# Patient Record
Sex: FEMALE | Race: OTHER | HISPANIC OR LATINO | URBAN - METROPOLITAN AREA
[De-identification: names, ages, dates, MRNs, and addresses within clinical notes are randomized per-mention and may not be internally consistent; named-entity substitution may affect disease eponyms.]

---

## 2018-08-31 ENCOUNTER — EMERGENCY (EMERGENCY)
Facility: HOSPITAL | Age: 33
LOS: 1 days | Discharge: HOME | End: 2018-08-31
Attending: EMERGENCY MEDICINE
Payer: MEDICAID

## 2018-08-31 VITALS
TEMPERATURE: 98 F | DIASTOLIC BLOOD PRESSURE: 87 MMHG | OXYGEN SATURATION: 100 % | SYSTOLIC BLOOD PRESSURE: 132 MMHG | HEART RATE: 94 BPM | RESPIRATION RATE: 16 BRPM

## 2018-08-31 VITALS — WEIGHT: 166.01 LBS | HEIGHT: 63 IN

## 2018-08-31 LAB
ANION GAP SERPL CALC-SCNC: 13 MMOL/L — SIGNIFICANT CHANGE UP (ref 5–17)
APPEARANCE UR: CLEAR — SIGNIFICANT CHANGE UP
BASOPHILS # BLD AUTO: 0 K/UL — SIGNIFICANT CHANGE UP (ref 0–0.2)
BASOPHILS NFR BLD AUTO: 0.1 % — SIGNIFICANT CHANGE UP (ref 0–2)
BILIRUB UR-MCNC: NEGATIVE — SIGNIFICANT CHANGE UP
BLD GP AB SCN SERPL QL: SIGNIFICANT CHANGE UP
BUN SERPL-MCNC: 9 MG/DL — SIGNIFICANT CHANGE UP (ref 8–20)
CALCIUM SERPL-MCNC: 10 MG/DL — SIGNIFICANT CHANGE UP (ref 8.6–10.2)
CHLORIDE SERPL-SCNC: 101 MMOL/L — SIGNIFICANT CHANGE UP (ref 98–107)
CO2 SERPL-SCNC: 23 MMOL/L — SIGNIFICANT CHANGE UP (ref 22–29)
COLOR SPEC: YELLOW — SIGNIFICANT CHANGE UP
CREAT SERPL-MCNC: 0.48 MG/DL — LOW (ref 0.5–1.3)
DIFF PNL FLD: NEGATIVE — SIGNIFICANT CHANGE UP
EOSINOPHIL # BLD AUTO: 0.2 K/UL — SIGNIFICANT CHANGE UP (ref 0–0.5)
EOSINOPHIL NFR BLD AUTO: 2.1 % — SIGNIFICANT CHANGE UP (ref 0–6)
EPI CELLS # UR: SIGNIFICANT CHANGE UP
GLUCOSE SERPL-MCNC: 92 MG/DL — SIGNIFICANT CHANGE UP (ref 70–115)
GLUCOSE UR QL: NEGATIVE MG/DL — SIGNIFICANT CHANGE UP
HCG SERPL-ACNC: SIGNIFICANT CHANGE UP MIU/ML
HCG UR QL: POSITIVE
HCT VFR BLD CALC: 41.2 % — SIGNIFICANT CHANGE UP (ref 37–47)
HGB BLD-MCNC: 13.5 G/DL — SIGNIFICANT CHANGE UP (ref 12–16)
KETONES UR-MCNC: NEGATIVE — SIGNIFICANT CHANGE UP
LEUKOCYTE ESTERASE UR-ACNC: ABNORMAL
LYMPHOCYTES # BLD AUTO: 2 K/UL — SIGNIFICANT CHANGE UP (ref 1–4.8)
LYMPHOCYTES # BLD AUTO: 23.7 % — SIGNIFICANT CHANGE UP (ref 20–55)
MCHC RBC-ENTMCNC: 26.9 PG — LOW (ref 27–31)
MCHC RBC-ENTMCNC: 32.8 G/DL — SIGNIFICANT CHANGE UP (ref 32–36)
MCV RBC AUTO: 82.2 FL — SIGNIFICANT CHANGE UP (ref 81–99)
MONOCYTES # BLD AUTO: 0.8 K/UL — SIGNIFICANT CHANGE UP (ref 0–0.8)
MONOCYTES NFR BLD AUTO: 9.4 % — SIGNIFICANT CHANGE UP (ref 3–10)
NEUTROPHILS # BLD AUTO: 5.4 K/UL — SIGNIFICANT CHANGE UP (ref 1.8–8)
NEUTROPHILS NFR BLD AUTO: 64.3 % — SIGNIFICANT CHANGE UP (ref 37–73)
NITRITE UR-MCNC: NEGATIVE — SIGNIFICANT CHANGE UP
PH UR: 6.5 — SIGNIFICANT CHANGE UP (ref 5–8)
PLATELET # BLD AUTO: 319 K/UL — SIGNIFICANT CHANGE UP (ref 150–400)
POTASSIUM SERPL-MCNC: 3.6 MMOL/L — SIGNIFICANT CHANGE UP (ref 3.5–5.3)
POTASSIUM SERPL-SCNC: 3.6 MMOL/L — SIGNIFICANT CHANGE UP (ref 3.5–5.3)
PROT UR-MCNC: NEGATIVE MG/DL — SIGNIFICANT CHANGE UP
RBC # BLD: 5.01 M/UL — SIGNIFICANT CHANGE UP (ref 4.4–5.2)
RBC # FLD: 13.8 % — SIGNIFICANT CHANGE UP (ref 11–15.6)
SODIUM SERPL-SCNC: 137 MMOL/L — SIGNIFICANT CHANGE UP (ref 135–145)
SP GR SPEC: 1.01 — SIGNIFICANT CHANGE UP (ref 1.01–1.02)
TYPE + AB SCN PNL BLD: SIGNIFICANT CHANGE UP
UROBILINOGEN FLD QL: NEGATIVE MG/DL — SIGNIFICANT CHANGE UP
WBC # BLD: 8.5 K/UL — SIGNIFICANT CHANGE UP (ref 4.8–10.8)
WBC # FLD AUTO: 8.5 K/UL — SIGNIFICANT CHANGE UP (ref 4.8–10.8)
WBC UR QL: SIGNIFICANT CHANGE UP

## 2018-08-31 PROCEDURE — 81025 URINE PREGNANCY TEST: CPT

## 2018-08-31 PROCEDURE — 86901 BLOOD TYPING SEROLOGIC RH(D): CPT

## 2018-08-31 PROCEDURE — 99284 EMERGENCY DEPT VISIT MOD MDM: CPT

## 2018-08-31 PROCEDURE — 76801 OB US < 14 WKS SINGLE FETUS: CPT | Mod: 26

## 2018-08-31 PROCEDURE — 36415 COLL VENOUS BLD VENIPUNCTURE: CPT

## 2018-08-31 PROCEDURE — 81001 URINALYSIS AUTO W/SCOPE: CPT

## 2018-08-31 PROCEDURE — 86900 BLOOD TYPING SEROLOGIC ABO: CPT

## 2018-08-31 PROCEDURE — 76817 TRANSVAGINAL US OBSTETRIC: CPT | Mod: 26

## 2018-08-31 PROCEDURE — T1013: CPT

## 2018-08-31 PROCEDURE — 76817 TRANSVAGINAL US OBSTETRIC: CPT

## 2018-08-31 PROCEDURE — 85027 COMPLETE CBC AUTOMATED: CPT

## 2018-08-31 PROCEDURE — 86850 RBC ANTIBODY SCREEN: CPT

## 2018-08-31 PROCEDURE — 76801 OB US < 14 WKS SINGLE FETUS: CPT

## 2018-08-31 PROCEDURE — 80048 BASIC METABOLIC PNL TOTAL CA: CPT

## 2018-08-31 PROCEDURE — 84702 CHORIONIC GONADOTROPIN TEST: CPT

## 2018-08-31 NOTE — ED ADULT NURSE NOTE - OBJECTIVE STATEMENT
Patient states that she has been having pelvic pain for the last week. Patient states that the pain is intermittent and is worse with walking. Patient states that she feels like "something is coming out". Patient denies any vaginal discharge. Pt states that she does have a hx of fibroids.

## 2018-08-31 NOTE — ED STATDOCS - PROGRESS NOTE DETAILS
NP NOTE:  34 y/o F , LMP 18 presents with c/o cramping, denies bleeding or spotting.  HPI, ROS, and PE of intake doctor reviewed.   ROS: no fever or chills, no visual disturbances, no ear pain or decreased hearing, sore throat or dysphagia, no CP, edema, no SOB, wheezing or cough, no abdominal pain, nausea, vomiting, diarrhea or constipation, no dysuria or hematuria, + pelvic cramping, no back or neck pain, no HA, dizziness, or weakness, no rash, pruritis.  PE: Well developed well appearing, in NAD, PERRL, lungs CTA/BL, S1S2 RR no murmur, no edema, abd + bs x 4 soft, LLQ tenderness to palpation, CHRISTO, no CVA tenderness, A&O x 3, CN II-Xii GI, MAEx 4,  5/5/ motors, = sensation, skin warm, dry and intact, no rash. NP NOTE:  Labs and US reviewed, Single live IUP.  Will d/c home f/u Dr. Villa early next week.  Results printed and given to patient.

## 2018-08-31 NOTE — ED STATDOCS - OBJECTIVE STATEMENT
34 y/o F pt presents to ED c/o throbbing abdominal pain and rectal pain. Pt is pregnant. +nausea. LMP; July 15th. When she was pregnant with last child, had placenta previa and fibroids. Has not seen doctor since pregnancy discovery. Denies vaginal bleeding, dysuria, hematuria.   : Jazmin BYNUM P 1, 0 A1 32 y/o F pt presents to ED c/o throbbing pain in LLQ and anus. Pt is pregnant: LMP 7/15/18.  +nausea. Concerned because of h/o placenta previa with last child and h/o fibroids. Has not seen doctor since pregnancy discovery. Denies vaginal bleeding, dysuria, hematuria.   : Jazmin GILLESPIE P [id number]

## 2018-08-31 NOTE — ED ADULT NURSE NOTE - CHPI ED NUR SYMPTOMS NEG
no abdominal distension/no nausea/no blood in stool/no chills/no dysuria/no fever/no hematuria/no diarrhea

## 2018-08-31 NOTE — ED STATDOCS - EYES, MLM
clear bilaterally.  Pupils equal, round, and reactive to light. No scleral icterus No scleral icterus

## 2018-08-31 NOTE — ED ADULT NURSE NOTE - NSIMPLEMENTINTERV_GEN_ALL_ED
Implemented All Universal Safety Interventions:  [place] to call system. Call bell, personal items and telephone within reach. Instruct patient to call for assistance. Room bathroom lighting operational. Non-slip footwear when patient is off stretcher. Physically safe environment: no spills, clutter or unnecessary equipment. Stretcher in lowest position, wheels locked, appropriate side rails in place.

## 2018-10-04 ENCOUNTER — APPOINTMENT (OUTPATIENT)
Dept: OBGYN | Facility: CLINIC | Age: 33
End: 2018-10-04
Payer: MEDICAID

## 2018-10-04 VITALS
DIASTOLIC BLOOD PRESSURE: 87 MMHG | WEIGHT: 164 LBS | SYSTOLIC BLOOD PRESSURE: 128 MMHG | HEIGHT: 63 IN | HEART RATE: 86 BPM | BODY MASS INDEX: 29.06 KG/M2

## 2018-10-04 DIAGNOSIS — Z78.9 OTHER SPECIFIED HEALTH STATUS: ICD-10-CM

## 2018-10-04 PROCEDURE — 99214 OFFICE O/P EST MOD 30 MIN: CPT

## 2018-10-05 LAB
C TRACH RRNA SPEC QL NAA+PROBE: NOT DETECTED
HPV HIGH+LOW RISK DNA PNL CVX: NOT DETECTED
N GONORRHOEA RRNA SPEC QL NAA+PROBE: NOT DETECTED
SOURCE TP AMPLIFICATION: NORMAL

## 2018-10-08 LAB — CYTOLOGY CVX/VAG DOC THIN PREP: NORMAL

## 2018-10-12 ENCOUNTER — APPOINTMENT (OUTPATIENT)
Dept: ANTEPARTUM | Facility: CLINIC | Age: 33
End: 2018-10-12

## 2018-10-17 ENCOUNTER — LABORATORY RESULT (OUTPATIENT)
Age: 33
End: 2018-10-17

## 2018-10-18 ENCOUNTER — ASOB RESULT (OUTPATIENT)
Age: 33
End: 2018-10-18

## 2018-10-18 ENCOUNTER — APPOINTMENT (OUTPATIENT)
Dept: ANTEPARTUM | Facility: CLINIC | Age: 33
End: 2018-10-18

## 2018-10-18 ENCOUNTER — APPOINTMENT (OUTPATIENT)
Dept: MATERNAL FETAL MEDICINE | Facility: CLINIC | Age: 33
End: 2018-10-18
Payer: MEDICAID

## 2018-10-18 ENCOUNTER — APPOINTMENT (OUTPATIENT)
Dept: ANTEPARTUM | Facility: CLINIC | Age: 33
End: 2018-10-18
Payer: MEDICAID

## 2018-10-18 PROCEDURE — 99213 OFFICE O/P EST LOW 20 MIN: CPT | Mod: 25

## 2018-10-18 PROCEDURE — 76801 OB US < 14 WKS SINGLE FETUS: CPT

## 2018-10-19 ENCOUNTER — NON-APPOINTMENT (OUTPATIENT)
Age: 33
End: 2018-10-19

## 2018-10-19 ENCOUNTER — APPOINTMENT (OUTPATIENT)
Dept: OBGYN | Facility: CLINIC | Age: 33
End: 2018-10-19
Payer: MEDICAID

## 2018-10-19 VITALS
WEIGHT: 166 LBS | DIASTOLIC BLOOD PRESSURE: 79 MMHG | HEIGHT: 63 IN | HEART RATE: 100 BPM | SYSTOLIC BLOOD PRESSURE: 135 MMHG | BODY MASS INDEX: 29.41 KG/M2

## 2018-10-19 LAB
BILIRUB UR QL STRIP: NORMAL
CLARITY UR: CLEAR
COLLECTION METHOD: NORMAL
GLUCOSE UR-MCNC: NORMAL
HCG UR QL: 0.2 EU/DL
HGB UR QL STRIP.AUTO: NORMAL
KETONES UR-MCNC: NORMAL
LEUKOCYTE ESTERASE UR QL STRIP: NORMAL
NITRITE UR QL STRIP: NORMAL
PH UR STRIP: 7
PROT UR STRIP-MCNC: NORMAL
SP GR UR STRIP: 1.01

## 2018-10-19 PROCEDURE — 90686 IIV4 VACC NO PRSV 0.5 ML IM: CPT

## 2018-10-19 PROCEDURE — 0502F SUBSEQUENT PRENATAL CARE: CPT

## 2018-10-19 PROCEDURE — G0008: CPT

## 2018-10-22 LAB
ABO + RH PNL BLD: NORMAL
APPEARANCE: CLEAR
BACTERIA UR CULT: NORMAL
BACTERIA: NEGATIVE
BASOPHILS # BLD AUTO: 0.02 K/UL
BASOPHILS NFR BLD AUTO: 0.2 %
BILIRUBIN URINE: NEGATIVE
BLD GP AB SCN SERPL QL: NORMAL
BLOOD URINE: NEGATIVE
CMV IGG SERPL QL: 4.6 U/ML
CMV IGG SERPL-IMP: POSITIVE
COLOR: YELLOW
EOSINOPHIL # BLD AUTO: 0.27 K/UL
EOSINOPHIL NFR BLD AUTO: 3.1 %
GLUCOSE QUALITATIVE U: NEGATIVE MG/DL
HBV SURFACE AG SER QL: NONREACTIVE
HCT VFR BLD CALC: 36.9 %
HCV AB SER QL: NONREACTIVE
HCV S/CO RATIO: 0.09 S/CO
HGB BLD-MCNC: 12.5 G/DL
HIV1+2 AB SPEC QL IA.RAPID: NONREACTIVE
HYALINE CASTS: 2 /LPF
IMM GRANULOCYTES NFR BLD AUTO: 0.5 %
KETONES URINE: NEGATIVE
LEUKOCYTE ESTERASE URINE: NEGATIVE
LYMPHOCYTES # BLD AUTO: 2.3 K/UL
LYMPHOCYTES NFR BLD AUTO: 26.1 %
M TB IFN-G BLD-IMP: NEGATIVE
MAN DIFF?: NORMAL
MCHC RBC-ENTMCNC: 27.7 PG
MCHC RBC-ENTMCNC: 33.9 GM/DL
MCV RBC AUTO: 81.6 FL
MEV IGG FLD QL IA: >300 AU/ML
MEV IGG+IGM SER-IMP: POSITIVE
MEV IGM SER QL: NEGATIVE
MICROSCOPIC-UA: NORMAL
MONOCYTES # BLD AUTO: 0.59 K/UL
MONOCYTES NFR BLD AUTO: 6.7 %
NEUTROPHILS # BLD AUTO: 5.59 K/UL
NEUTROPHILS NFR BLD AUTO: 63.4 %
NITRITE URINE: NEGATIVE
PH URINE: 7
PLATELET # BLD AUTO: 290 K/UL
PROTEIN URINE: NEGATIVE MG/DL
QUANTIFERON TB PLUS MITOGEN MINUS NIL: >10 IU/ML
QUANTIFERON TB PLUS NIL: 0.03 IU/ML
QUANTIFERON TB PLUS TB1 MINUS NIL: 0 IU/ML
QUANTIFERON TB PLUS TB2 MINUS NIL: 0 IU/ML
RBC # BLD: 4.52 M/UL
RBC # FLD: 13.4 %
RED BLOOD CELLS URINE: 3 /HPF
RUBV IGG FLD-ACNC: 5.4 INDEX
RUBV IGG SER-IMP: POSITIVE
SPECIFIC GRAVITY URINE: 1.01
SQUAMOUS EPITHELIAL CELLS: 3 /HPF
T GONDII AB SER-IMP: POSITIVE
T GONDII IGG SER QL: 165 IU/ML
T PALLIDUM AB SER QL IA: NEGATIVE
UROBILINOGEN URINE: NEGATIVE MG/DL
VZV AB TITR SER: POSITIVE
VZV IGG SER IF-ACNC: 296.4 INDEX
WBC # FLD AUTO: 8.81 K/UL
WHITE BLOOD CELLS URINE: 0 /HPF

## 2018-10-24 LAB
B19V IGG SER QL IA: 0.8 INDEX
B19V IGG+IGM SER-IMP: NEGATIVE
B19V IGG+IGM SER-IMP: NORMAL
B19V IGM FLD-ACNC: 0.2 INDEX
B19V IGM SER-ACNC: NEGATIVE
HGB A MFR BLD: 98.2 %
HGB A2 MFR BLD: 1.8 %
HGB FRACT BLD-IMP: NORMAL

## 2018-10-25 LAB — FMR1 GENE MUT ANL BLD/T: NORMAL

## 2018-10-29 LAB
AR GENE MUT ANL BLD/T: NEGATIVE
CFTR MUT TESTED BLD/T: NEGATIVE

## 2018-11-01 ENCOUNTER — APPOINTMENT (OUTPATIENT)
Dept: ANTEPARTUM | Facility: CLINIC | Age: 33
End: 2018-11-01

## 2018-11-05 LAB
2ND TRIMESTER DATA: NORMAL
AFP PNL SERPL: NORMAL
AFP SERPL-ACNC: NORMAL
B-HCG FREE SERPL-MCNC: NORMAL
CLINICAL BIOCHEMIST REVIEW: NORMAL
INHIBIN A SERPL-MCNC: NORMAL
NOTES NTD: NORMAL
U ESTRIOL SERPL-SCNC: NORMAL

## 2018-11-09 ENCOUNTER — NON-APPOINTMENT (OUTPATIENT)
Age: 33
End: 2018-11-09

## 2018-11-09 ENCOUNTER — APPOINTMENT (OUTPATIENT)
Dept: OBGYN | Facility: CLINIC | Age: 33
End: 2018-11-09
Payer: MEDICAID

## 2018-11-09 VITALS
HEIGHT: 63 IN | HEART RATE: 83 BPM | DIASTOLIC BLOOD PRESSURE: 83 MMHG | WEIGHT: 166.31 LBS | SYSTOLIC BLOOD PRESSURE: 122 MMHG | BODY MASS INDEX: 29.47 KG/M2

## 2018-11-09 PROCEDURE — 0502F SUBSEQUENT PRENATAL CARE: CPT

## 2018-11-21 ENCOUNTER — CLINICAL ADVICE (OUTPATIENT)
Age: 33
End: 2018-11-21

## 2018-11-27 DIAGNOSIS — Z34.90 ENCOUNTER FOR SUPERVISION OF NORMAL PREGNANCY, UNSPECIFIED, UNSPECIFIED TRIMESTER: ICD-10-CM

## 2018-11-27 DIAGNOSIS — Z3A.14 14 WEEKS GESTATION OF PREGNANCY: ICD-10-CM

## 2018-11-27 DIAGNOSIS — Z3A.16 16 WEEKS GESTATION OF PREGNANCY: ICD-10-CM

## 2018-11-29 ENCOUNTER — ASOB RESULT (OUTPATIENT)
Age: 33
End: 2018-11-29

## 2018-11-29 ENCOUNTER — APPOINTMENT (OUTPATIENT)
Dept: ANTEPARTUM | Facility: CLINIC | Age: 33
End: 2018-11-29
Payer: MEDICAID

## 2018-11-29 PROCEDURE — 76811 OB US DETAILED SNGL FETUS: CPT

## 2018-11-30 ENCOUNTER — APPOINTMENT (OUTPATIENT)
Dept: OBGYN | Facility: CLINIC | Age: 33
End: 2018-11-30
Payer: MEDICAID

## 2018-11-30 ENCOUNTER — NON-APPOINTMENT (OUTPATIENT)
Age: 33
End: 2018-11-30

## 2018-11-30 VITALS
DIASTOLIC BLOOD PRESSURE: 86 MMHG | HEIGHT: 63 IN | WEIGHT: 169 LBS | BODY MASS INDEX: 29.95 KG/M2 | HEART RATE: 98 BPM | SYSTOLIC BLOOD PRESSURE: 137 MMHG

## 2018-11-30 LAB
BILIRUB UR QL STRIP: NEGATIVE
CLARITY UR: NORMAL
COLLECTION METHOD: NORMAL
GLUCOSE UR-MCNC: NORMAL
HCG UR QL: 0.2 EU/DL
HGB UR QL STRIP.AUTO: NEGATIVE
KETONES UR-MCNC: 1.02
LEUKOCYTE ESTERASE UR QL STRIP: NEGATIVE
NITRITE UR QL STRIP: NEGATIVE
PH UR STRIP: 7
PROT UR STRIP-MCNC: NEGATIVE
SP GR UR STRIP: 1.02

## 2018-11-30 PROCEDURE — 81003 URINALYSIS AUTO W/O SCOPE: CPT | Mod: QW

## 2018-11-30 PROCEDURE — 0502F SUBSEQUENT PRENATAL CARE: CPT

## 2018-12-13 ENCOUNTER — APPOINTMENT (OUTPATIENT)
Dept: ANTEPARTUM | Facility: CLINIC | Age: 33
End: 2018-12-13
Payer: MEDICAID

## 2018-12-13 ENCOUNTER — ASOB RESULT (OUTPATIENT)
Age: 33
End: 2018-12-13

## 2018-12-13 PROCEDURE — 76816 OB US FOLLOW-UP PER FETUS: CPT

## 2018-12-20 DIAGNOSIS — Z3A.19 19 WEEKS GESTATION OF PREGNANCY: ICD-10-CM

## 2018-12-21 ENCOUNTER — NON-APPOINTMENT (OUTPATIENT)
Age: 33
End: 2018-12-21

## 2018-12-21 ENCOUNTER — APPOINTMENT (OUTPATIENT)
Dept: OBGYN | Facility: CLINIC | Age: 33
End: 2018-12-21
Payer: MEDICAID

## 2018-12-21 VITALS
SYSTOLIC BLOOD PRESSURE: 120 MMHG | DIASTOLIC BLOOD PRESSURE: 80 MMHG | WEIGHT: 168 LBS | HEIGHT: 63 IN | BODY MASS INDEX: 29.77 KG/M2

## 2018-12-21 PROCEDURE — 0502F SUBSEQUENT PRENATAL CARE: CPT

## 2018-12-28 DIAGNOSIS — Z3A.23 23 WEEKS GESTATION OF PREGNANCY: ICD-10-CM

## 2019-01-03 ENCOUNTER — APPOINTMENT (OUTPATIENT)
Dept: PEDIATRIC CARDIOLOGY | Facility: CLINIC | Age: 34
End: 2019-01-03
Payer: MEDICAID

## 2019-01-03 PROCEDURE — 76825 ECHO EXAM OF FETAL HEART: CPT

## 2019-01-03 PROCEDURE — 93325 DOPPLER ECHO COLOR FLOW MAPG: CPT

## 2019-01-03 PROCEDURE — 99203 OFFICE O/P NEW LOW 30 MIN: CPT | Mod: 25

## 2019-01-03 PROCEDURE — 76827 ECHO EXAM OF FETAL HEART: CPT

## 2019-01-11 ENCOUNTER — APPOINTMENT (OUTPATIENT)
Dept: OBGYN | Facility: CLINIC | Age: 34
End: 2019-01-11
Payer: MEDICAID

## 2019-01-11 ENCOUNTER — NON-APPOINTMENT (OUTPATIENT)
Age: 34
End: 2019-01-11

## 2019-01-11 VITALS
BODY MASS INDEX: 30.51 KG/M2 | HEIGHT: 63 IN | SYSTOLIC BLOOD PRESSURE: 131 MMHG | WEIGHT: 172.19 LBS | DIASTOLIC BLOOD PRESSURE: 82 MMHG | HEART RATE: 102 BPM

## 2019-01-11 PROCEDURE — 0502F SUBSEQUENT PRENATAL CARE: CPT

## 2019-01-13 LAB
BASOPHILS # BLD AUTO: 0.01 K/UL
BASOPHILS NFR BLD AUTO: 0.1 %
EOSINOPHIL # BLD AUTO: 0.19 K/UL
EOSINOPHIL NFR BLD AUTO: 2 %
GLUCOSE 1H P 50 G GLC PO SERPL-MCNC: 126 MG/DL
HCT VFR BLD CALC: 38.8 %
HGB BLD-MCNC: 11.3 G/DL
IMM GRANULOCYTES NFR BLD AUTO: 1 %
LYMPHOCYTES # BLD AUTO: 2.13 K/UL
LYMPHOCYTES NFR BLD AUTO: 22.8 %
MAN DIFF?: NORMAL
MCHC RBC-ENTMCNC: 28.1 PG
MCHC RBC-ENTMCNC: 29.1 GM/DL
MCV RBC AUTO: 96.5 FL
MONOCYTES # BLD AUTO: 0.49 K/UL
MONOCYTES NFR BLD AUTO: 5.2 %
NEUTROPHILS # BLD AUTO: 6.44 K/UL
NEUTROPHILS NFR BLD AUTO: 68.9 %
PLATELET # BLD AUTO: 354 K/UL
RBC # BLD: 4.02 M/UL
RBC # FLD: 14.2 %
WBC # FLD AUTO: 9.35 K/UL

## 2019-01-30 PROBLEM — O09.522 MULTIGRAVIDA OF ADVANCED MATERNAL AGE IN SECOND TRIMESTER: Status: RESOLVED | Noted: 2019-01-04 | Resolved: 2019-01-30

## 2019-01-30 PROBLEM — Z3A.25 25 WEEKS GESTATION OF PREGNANCY: Status: RESOLVED | Noted: 2019-01-11 | Resolved: 2019-01-30

## 2019-01-31 ENCOUNTER — APPOINTMENT (OUTPATIENT)
Dept: ANTEPARTUM | Facility: CLINIC | Age: 34
End: 2019-01-31
Payer: MEDICAID

## 2019-01-31 ENCOUNTER — ASOB RESULT (OUTPATIENT)
Age: 34
End: 2019-01-31

## 2019-01-31 PROCEDURE — 76816 OB US FOLLOW-UP PER FETUS: CPT

## 2019-01-31 PROCEDURE — 76817 TRANSVAGINAL US OBSTETRIC: CPT

## 2019-02-01 ENCOUNTER — NON-APPOINTMENT (OUTPATIENT)
Age: 34
End: 2019-02-01

## 2019-02-01 ENCOUNTER — APPOINTMENT (OUTPATIENT)
Dept: OBGYN | Facility: CLINIC | Age: 34
End: 2019-02-01
Payer: MEDICAID

## 2019-02-01 VITALS
BODY MASS INDEX: 30.83 KG/M2 | DIASTOLIC BLOOD PRESSURE: 71 MMHG | HEIGHT: 63 IN | WEIGHT: 174 LBS | SYSTOLIC BLOOD PRESSURE: 122 MMHG | HEART RATE: 94 BPM

## 2019-02-01 DIAGNOSIS — Z3A.25 25 WEEKS GESTATION OF PREGNANCY: ICD-10-CM

## 2019-02-01 DIAGNOSIS — O09.522 SUPERVISION OF ELDERLY MULTIGRAVIDA, SECOND TRIMESTER: ICD-10-CM

## 2019-02-01 DIAGNOSIS — N91.1 SECONDARY AMENORRHEA: ICD-10-CM

## 2019-02-01 PROCEDURE — 0502F SUBSEQUENT PRENATAL CARE: CPT

## 2019-02-14 ENCOUNTER — APPOINTMENT (OUTPATIENT)
Dept: ANTEPARTUM | Facility: CLINIC | Age: 34
End: 2019-02-14
Payer: MEDICAID

## 2019-02-14 ENCOUNTER — ASOB RESULT (OUTPATIENT)
Age: 34
End: 2019-02-14

## 2019-02-14 PROCEDURE — 76819 FETAL BIOPHYS PROFIL W/O NST: CPT

## 2019-02-14 PROCEDURE — 76817 TRANSVAGINAL US OBSTETRIC: CPT

## 2019-02-14 PROCEDURE — 76816 OB US FOLLOW-UP PER FETUS: CPT

## 2019-02-15 ENCOUNTER — APPOINTMENT (OUTPATIENT)
Dept: OBGYN | Facility: CLINIC | Age: 34
End: 2019-02-15
Payer: MEDICAID

## 2019-02-15 ENCOUNTER — NON-APPOINTMENT (OUTPATIENT)
Age: 34
End: 2019-02-15

## 2019-02-15 VITALS
DIASTOLIC BLOOD PRESSURE: 73 MMHG | HEIGHT: 63 IN | BODY MASS INDEX: 31.01 KG/M2 | SYSTOLIC BLOOD PRESSURE: 110 MMHG | WEIGHT: 175 LBS

## 2019-02-15 PROCEDURE — 0502F SUBSEQUENT PRENATAL CARE: CPT

## 2019-02-26 DIAGNOSIS — Z3A.31 31 WEEKS GESTATION OF PREGNANCY: ICD-10-CM

## 2019-02-26 DIAGNOSIS — Z3A.28 28 WEEKS GESTATION OF PREGNANCY: ICD-10-CM

## 2019-03-01 ENCOUNTER — APPOINTMENT (OUTPATIENT)
Dept: OBGYN | Facility: CLINIC | Age: 34
End: 2019-03-01
Payer: MEDICAID

## 2019-03-01 ENCOUNTER — NON-APPOINTMENT (OUTPATIENT)
Age: 34
End: 2019-03-01

## 2019-03-01 VITALS
HEART RATE: 102 BPM | SYSTOLIC BLOOD PRESSURE: 127 MMHG | HEIGHT: 63 IN | WEIGHT: 178.06 LBS | DIASTOLIC BLOOD PRESSURE: 70 MMHG | BODY MASS INDEX: 31.55 KG/M2

## 2019-03-01 PROCEDURE — 0502F SUBSEQUENT PRENATAL CARE: CPT

## 2019-03-12 PROBLEM — Z3A.32 32 WEEKS GESTATION OF PREGNANCY: Status: RESOLVED | Noted: 2019-03-01 | Resolved: 2019-03-12

## 2019-03-14 ENCOUNTER — APPOINTMENT (OUTPATIENT)
Dept: OBGYN | Facility: CLINIC | Age: 34
End: 2019-03-14
Payer: MEDICAID

## 2019-03-14 ENCOUNTER — NON-APPOINTMENT (OUTPATIENT)
Age: 34
End: 2019-03-14

## 2019-03-14 DIAGNOSIS — Z3A.32 32 WEEKS GESTATION OF PREGNANCY: ICD-10-CM

## 2019-03-14 PROCEDURE — 0502F SUBSEQUENT PRENATAL CARE: CPT

## 2019-03-20 DIAGNOSIS — Z3A.34 34 WEEKS GESTATION OF PREGNANCY: ICD-10-CM

## 2019-03-22 ENCOUNTER — APPOINTMENT (OUTPATIENT)
Dept: OBGYN | Facility: CLINIC | Age: 34
End: 2019-03-22
Payer: MEDICAID

## 2019-03-22 VITALS
BODY MASS INDEX: 32.07 KG/M2 | HEIGHT: 63 IN | WEIGHT: 181 LBS | SYSTOLIC BLOOD PRESSURE: 125 MMHG | DIASTOLIC BLOOD PRESSURE: 83 MMHG | HEART RATE: 104 BPM

## 2019-03-22 PROCEDURE — 36415 COLL VENOUS BLD VENIPUNCTURE: CPT

## 2019-03-22 PROCEDURE — 99213 OFFICE O/P EST LOW 20 MIN: CPT

## 2019-03-25 LAB
GP B STREP DNA SPEC QL NAA+PROBE: NORMAL
GP B STREP DNA SPEC QL NAA+PROBE: NOT DETECTED
HIV1+2 AB SPEC QL IA.RAPID: NONREACTIVE
SOURCE GBS: NORMAL

## 2019-03-27 PROBLEM — Z3A.35 35 WEEKS GESTATION OF PREGNANCY: Status: RESOLVED | Noted: 2019-03-22 | Resolved: 2019-03-27

## 2019-03-29 ENCOUNTER — APPOINTMENT (OUTPATIENT)
Dept: OBGYN | Facility: CLINIC | Age: 34
End: 2019-03-29
Payer: MEDICAID

## 2019-03-29 ENCOUNTER — NON-APPOINTMENT (OUTPATIENT)
Age: 34
End: 2019-03-29

## 2019-03-29 VITALS
BODY MASS INDEX: 31.96 KG/M2 | HEIGHT: 63 IN | WEIGHT: 180.38 LBS | SYSTOLIC BLOOD PRESSURE: 127 MMHG | DIASTOLIC BLOOD PRESSURE: 88 MMHG | HEART RATE: 106 BPM

## 2019-03-29 DIAGNOSIS — Z3A.35 35 WEEKS GESTATION OF PREGNANCY: ICD-10-CM

## 2019-03-29 PROCEDURE — 0502F SUBSEQUENT PRENATAL CARE: CPT

## 2019-04-01 PROBLEM — N91.1 SECONDARY AMENORRHEA: Status: RESOLVED | Noted: 2018-10-04 | Resolved: 2019-04-01

## 2019-04-03 PROBLEM — Z3A.36 36 WEEKS GESTATION OF PREGNANCY: Status: RESOLVED | Noted: 2019-03-29 | Resolved: 2019-04-03

## 2019-04-05 ENCOUNTER — APPOINTMENT (OUTPATIENT)
Dept: OBGYN | Facility: CLINIC | Age: 34
End: 2019-04-05
Payer: MEDICAID

## 2019-04-05 ENCOUNTER — NON-APPOINTMENT (OUTPATIENT)
Age: 34
End: 2019-04-05

## 2019-04-05 DIAGNOSIS — Z3A.36 36 WEEKS GESTATION OF PREGNANCY: ICD-10-CM

## 2019-04-05 PROCEDURE — 99213 OFFICE O/P EST LOW 20 MIN: CPT

## 2019-04-08 DIAGNOSIS — Z3A.37 37 WEEKS GESTATION OF PREGNANCY: ICD-10-CM

## 2019-04-10 ENCOUNTER — APPOINTMENT (OUTPATIENT)
Dept: OBGYN | Facility: CLINIC | Age: 34
End: 2019-04-10
Payer: MEDICAID

## 2019-04-10 ENCOUNTER — NON-APPOINTMENT (OUTPATIENT)
Age: 34
End: 2019-04-10

## 2019-04-10 PROCEDURE — 0502F SUBSEQUENT PRENATAL CARE: CPT

## 2019-04-10 PROCEDURE — 59025 FETAL NON-STRESS TEST: CPT

## 2019-04-11 ENCOUNTER — ASOB RESULT (OUTPATIENT)
Age: 34
End: 2019-04-11

## 2019-04-11 ENCOUNTER — APPOINTMENT (OUTPATIENT)
Dept: ANTEPARTUM | Facility: CLINIC | Age: 34
End: 2019-04-11
Payer: MEDICAID

## 2019-04-11 PROCEDURE — 76816 OB US FOLLOW-UP PER FETUS: CPT

## 2019-04-11 PROCEDURE — 76819 FETAL BIOPHYS PROFIL W/O NST: CPT

## 2019-04-11 PROCEDURE — 76817 TRANSVAGINAL US OBSTETRIC: CPT

## 2019-04-15 ENCOUNTER — INPATIENT (INPATIENT)
Facility: HOSPITAL | Age: 34
LOS: 1 days | Discharge: HOME | End: 2019-04-17
Attending: OBSTETRICS & GYNECOLOGY | Admitting: OBSTETRICS & GYNECOLOGY
Payer: MEDICAID

## 2019-04-15 ENCOUNTER — TRANSCRIPTION ENCOUNTER (OUTPATIENT)
Age: 34
End: 2019-04-15

## 2019-04-15 VITALS — HEART RATE: 105 BPM | DIASTOLIC BLOOD PRESSURE: 81 MMHG | SYSTOLIC BLOOD PRESSURE: 118 MMHG

## 2019-04-15 DIAGNOSIS — O47.1 FALSE LABOR AT OR AFTER 37 COMPLETED WEEKS OF GESTATION: ICD-10-CM

## 2019-04-15 DIAGNOSIS — O44.00 COMPLETE PLACENTA PREVIA NOS OR WITHOUT HEMORRHAGE, UNSPECIFIED TRIMESTER: ICD-10-CM

## 2019-04-15 DIAGNOSIS — O09.513 SUPERVISION OF ELDERLY PRIMIGRAVIDA, THIRD TRIMESTER: ICD-10-CM

## 2019-04-15 DIAGNOSIS — O35.9XX0 MATERNAL CARE FOR (SUSPECTED) FETAL ABNORMALITY AND DAMAGE, UNSPECIFIED, NOT APPLICABLE OR UNSPECIFIED: ICD-10-CM

## 2019-04-15 DIAGNOSIS — Z64.1 PROBLEMS RELATED TO MULTIPARITY: ICD-10-CM

## 2019-04-15 DIAGNOSIS — Z3A.38 38 WEEKS GESTATION OF PREGNANCY: ICD-10-CM

## 2019-04-15 LAB
APPEARANCE UR: CLEAR — SIGNIFICANT CHANGE UP
APTT BLD: 27.5 SEC — SIGNIFICANT CHANGE UP (ref 27.5–36.3)
BASOPHILS # BLD AUTO: 0 K/UL — SIGNIFICANT CHANGE UP (ref 0–0.2)
BASOPHILS # BLD AUTO: 0 K/UL — SIGNIFICANT CHANGE UP (ref 0–0.2)
BASOPHILS NFR BLD AUTO: 0.1 % — SIGNIFICANT CHANGE UP (ref 0–2)
BASOPHILS NFR BLD AUTO: 0.1 % — SIGNIFICANT CHANGE UP (ref 0–2)
BILIRUB UR-MCNC: NEGATIVE — SIGNIFICANT CHANGE UP
BLD GP AB SCN SERPL QL: SIGNIFICANT CHANGE UP
COLOR SPEC: YELLOW — SIGNIFICANT CHANGE UP
DIFF PNL FLD: ABNORMAL
EOSINOPHIL # BLD AUTO: 0 K/UL — SIGNIFICANT CHANGE UP (ref 0–0.5)
EOSINOPHIL # BLD AUTO: 0 K/UL — SIGNIFICANT CHANGE UP (ref 0–0.5)
EOSINOPHIL NFR BLD AUTO: 0.1 % — SIGNIFICANT CHANGE UP (ref 0–6)
EOSINOPHIL NFR BLD AUTO: 0.3 % — SIGNIFICANT CHANGE UP (ref 0–6)
EPI CELLS # UR: SIGNIFICANT CHANGE UP
FIBRINOGEN PPP-MCNC: 564 MG/DL — HIGH (ref 350–510)
GLUCOSE UR QL: NEGATIVE MG/DL — SIGNIFICANT CHANGE UP
HCT VFR BLD CALC: 29.3 % — LOW (ref 37–47)
HCT VFR BLD CALC: 32.8 % — LOW (ref 37–47)
HCT VFR BLD CALC: 36.1 % — LOW (ref 37–47)
HGB BLD-MCNC: 10.8 G/DL — LOW (ref 12–16)
HGB BLD-MCNC: 11.9 G/DL — LOW (ref 12–16)
HGB BLD-MCNC: 9.7 G/DL — LOW (ref 12–16)
INR BLD: 0.88 RATIO — SIGNIFICANT CHANGE UP (ref 0.88–1.16)
KETONES UR-MCNC: NEGATIVE — SIGNIFICANT CHANGE UP
LEUKOCYTE ESTERASE UR-ACNC: NEGATIVE — SIGNIFICANT CHANGE UP
LYMPHOCYTES # BLD AUTO: 1.8 K/UL — SIGNIFICANT CHANGE UP (ref 1–4.8)
LYMPHOCYTES # BLD AUTO: 12.1 % — LOW (ref 20–55)
LYMPHOCYTES # BLD AUTO: 17.8 % — LOW (ref 20–55)
LYMPHOCYTES # BLD AUTO: 2.4 K/UL — SIGNIFICANT CHANGE UP (ref 1–4.8)
MCHC RBC-ENTMCNC: 27.1 PG — SIGNIFICANT CHANGE UP (ref 27–31)
MCHC RBC-ENTMCNC: 27.2 PG — SIGNIFICANT CHANGE UP (ref 27–31)
MCHC RBC-ENTMCNC: 27.5 PG — SIGNIFICANT CHANGE UP (ref 27–31)
MCHC RBC-ENTMCNC: 32.9 G/DL — SIGNIFICANT CHANGE UP (ref 32–36)
MCHC RBC-ENTMCNC: 33 G/DL — SIGNIFICANT CHANGE UP (ref 32–36)
MCHC RBC-ENTMCNC: 33.1 G/DL — SIGNIFICANT CHANGE UP (ref 32–36)
MCV RBC AUTO: 82.3 FL — SIGNIFICANT CHANGE UP (ref 81–99)
MCV RBC AUTO: 82.4 FL — SIGNIFICANT CHANGE UP (ref 81–99)
MCV RBC AUTO: 83.6 FL — SIGNIFICANT CHANGE UP (ref 81–99)
MONOCYTES # BLD AUTO: 0.9 K/UL — HIGH (ref 0–0.8)
MONOCYTES # BLD AUTO: 1.3 K/UL — HIGH (ref 0–0.8)
MONOCYTES NFR BLD AUTO: 6.9 % — SIGNIFICANT CHANGE UP (ref 3–10)
MONOCYTES NFR BLD AUTO: 8.4 % — SIGNIFICANT CHANGE UP (ref 3–10)
NEUTROPHILS # BLD AUTO: 10.1 K/UL — HIGH (ref 1.8–8)
NEUTROPHILS # BLD AUTO: 11.9 K/UL — HIGH (ref 1.8–8)
NEUTROPHILS NFR BLD AUTO: 74.5 % — HIGH (ref 37–73)
NEUTROPHILS NFR BLD AUTO: 78.9 % — HIGH (ref 37–73)
NITRITE UR-MCNC: NEGATIVE — SIGNIFICANT CHANGE UP
PH UR: 7 — SIGNIFICANT CHANGE UP (ref 5–8)
PLATELET # BLD AUTO: 228 K/UL — SIGNIFICANT CHANGE UP (ref 150–400)
PLATELET # BLD AUTO: 231 K/UL — SIGNIFICANT CHANGE UP (ref 150–400)
PLATELET # BLD AUTO: 238 K/UL — SIGNIFICANT CHANGE UP (ref 150–400)
PROT UR-MCNC: NEGATIVE MG/DL — SIGNIFICANT CHANGE UP
PROTHROM AB SERPL-ACNC: 10.1 SEC — SIGNIFICANT CHANGE UP (ref 10–12.9)
RBC # BLD: 3.56 M/UL — LOW (ref 4.4–5.2)
RBC # BLD: 3.98 M/UL — LOW (ref 4.4–5.2)
RBC # BLD: 4.32 M/UL — LOW (ref 4.4–5.2)
RBC # FLD: 13.2 % — SIGNIFICANT CHANGE UP (ref 11–15.6)
RBC # FLD: 13.3 % — SIGNIFICANT CHANGE UP (ref 11–15.6)
RBC # FLD: 13.3 % — SIGNIFICANT CHANGE UP (ref 11–15.6)
RBC CASTS # UR COMP ASSIST: SIGNIFICANT CHANGE UP /HPF (ref 0–4)
SP GR SPEC: 1 — LOW (ref 1.01–1.02)
TYPE + AB SCN PNL BLD: SIGNIFICANT CHANGE UP
UROBILINOGEN FLD QL: NEGATIVE MG/DL — SIGNIFICANT CHANGE UP
WBC # BLD: 13.5 K/UL — HIGH (ref 4.8–10.8)
WBC # BLD: 15.1 K/UL — HIGH (ref 4.8–10.8)
WBC # BLD: 18.3 K/UL — HIGH (ref 4.8–10.8)
WBC # FLD AUTO: 13.5 K/UL — HIGH (ref 4.8–10.8)
WBC # FLD AUTO: 15.1 K/UL — HIGH (ref 4.8–10.8)
WBC # FLD AUTO: 18.3 K/UL — HIGH (ref 4.8–10.8)
WBC UR QL: SIGNIFICANT CHANGE UP

## 2019-04-15 PROCEDURE — 59400 OBSTETRICAL CARE: CPT | Mod: U9

## 2019-04-15 RX ORDER — ONDANSETRON 4 MG/1
4 TABLET ORAL
Qty: 3 | Refills: 3 | Status: DISCONTINUED | COMMUNITY
Start: 2018-10-19 | End: 2019-04-15

## 2019-04-15 RX ORDER — IBUPROFEN 200 MG
600 TABLET ORAL EVERY 6 HOURS
Qty: 0 | Refills: 0 | Status: DISCONTINUED | OUTPATIENT
Start: 2019-04-15 | End: 2019-04-17

## 2019-04-15 RX ORDER — SODIUM CHLORIDE 9 MG/ML
3 INJECTION INTRAMUSCULAR; INTRAVENOUS; SUBCUTANEOUS EVERY 8 HOURS
Qty: 0 | Refills: 0 | Status: DISCONTINUED | OUTPATIENT
Start: 2019-04-15 | End: 2019-04-17

## 2019-04-15 RX ORDER — OXYCODONE AND ACETAMINOPHEN 5; 325 MG/1; MG/1
2 TABLET ORAL EVERY 6 HOURS
Qty: 0 | Refills: 0 | Status: DISCONTINUED | OUTPATIENT
Start: 2019-04-15 | End: 2019-04-17

## 2019-04-15 RX ORDER — OXYCODONE AND ACETAMINOPHEN 5; 325 MG/1; MG/1
1 TABLET ORAL
Qty: 0 | Refills: 0 | Status: DISCONTINUED | OUTPATIENT
Start: 2019-04-15 | End: 2019-04-17

## 2019-04-15 RX ORDER — OXYTOCIN 10 UNIT/ML
333.33 VIAL (ML) INJECTION
Qty: 20 | Refills: 0 | Status: DISCONTINUED | OUTPATIENT
Start: 2019-04-15 | End: 2019-04-17

## 2019-04-15 RX ORDER — OXYTOCIN 10 UNIT/ML
41.67 VIAL (ML) INJECTION
Qty: 20 | Refills: 0 | Status: DISCONTINUED | OUTPATIENT
Start: 2019-04-15 | End: 2019-04-17

## 2019-04-15 RX ORDER — CITRIC ACID/SODIUM CITRATE 300-500 MG
30 SOLUTION, ORAL ORAL ONCE
Qty: 0 | Refills: 0 | Status: DISCONTINUED | OUTPATIENT
Start: 2019-04-15 | End: 2019-04-15

## 2019-04-15 RX ORDER — ACETAMINOPHEN 500 MG
650 TABLET ORAL EVERY 6 HOURS
Qty: 0 | Refills: 0 | Status: DISCONTINUED | OUTPATIENT
Start: 2019-04-15 | End: 2019-04-17

## 2019-04-15 RX ORDER — SIMETHICONE 80 MG/1
80 TABLET, CHEWABLE ORAL EVERY 6 HOURS
Qty: 0 | Refills: 0 | Status: DISCONTINUED | OUTPATIENT
Start: 2019-04-15 | End: 2019-04-17

## 2019-04-15 RX ORDER — SODIUM CHLORIDE 9 MG/ML
1000 INJECTION, SOLUTION INTRAVENOUS ONCE
Qty: 0 | Refills: 0 | Status: DISCONTINUED | OUTPATIENT
Start: 2019-04-15 | End: 2019-04-15

## 2019-04-15 RX ORDER — ONDANSETRON 4 MG/1
4 TABLET ORAL
Qty: 30 | Refills: 3 | Status: DISCONTINUED | COMMUNITY
Start: 2018-10-04 | End: 2019-04-15

## 2019-04-15 RX ORDER — DOCUSATE SODIUM 100 MG
100 CAPSULE ORAL
Qty: 0 | Refills: 0 | Status: DISCONTINUED | OUTPATIENT
Start: 2019-04-15 | End: 2019-04-17

## 2019-04-15 RX ORDER — TETANUS TOXOID, REDUCED DIPHTHERIA TOXOID AND ACELLULAR PERTUSSIS VACCINE, ADSORBED 5; 2.5; 8; 8; 2.5 [IU]/.5ML; [IU]/.5ML; UG/.5ML; UG/.5ML; UG/.5ML
0.5 SUSPENSION INTRAMUSCULAR ONCE
Qty: 0 | Refills: 0 | Status: DISCONTINUED | OUTPATIENT
Start: 2019-04-15 | End: 2019-04-17

## 2019-04-15 RX ORDER — LANOLIN
1 OINTMENT (GRAM) TOPICAL EVERY 6 HOURS
Qty: 0 | Refills: 0 | Status: DISCONTINUED | OUTPATIENT
Start: 2019-04-15 | End: 2019-04-17

## 2019-04-15 RX ORDER — DIBUCAINE 1 %
1 OINTMENT (GRAM) RECTAL EVERY 4 HOURS
Qty: 0 | Refills: 0 | Status: DISCONTINUED | OUTPATIENT
Start: 2019-04-15 | End: 2019-04-17

## 2019-04-15 RX ORDER — CARBOPROST TROMETHAMINE 250 UG/ML
250 INJECTION, SOLUTION INTRAMUSCULAR ONCE
Qty: 0 | Refills: 0 | Status: COMPLETED | OUTPATIENT
Start: 2019-04-15 | End: 2019-04-15

## 2019-04-15 RX ORDER — DIPHENHYDRAMINE HCL 50 MG
25 CAPSULE ORAL EVERY 6 HOURS
Qty: 0 | Refills: 0 | Status: DISCONTINUED | OUTPATIENT
Start: 2019-04-15 | End: 2019-04-17

## 2019-04-15 RX ORDER — HYDROCORTISONE 1 %
1 OINTMENT (GRAM) TOPICAL EVERY 4 HOURS
Qty: 0 | Refills: 0 | Status: DISCONTINUED | OUTPATIENT
Start: 2019-04-15 | End: 2019-04-17

## 2019-04-15 RX ORDER — GLYCERIN ADULT
1 SUPPOSITORY, RECTAL RECTAL AT BEDTIME
Qty: 0 | Refills: 0 | Status: DISCONTINUED | OUTPATIENT
Start: 2019-04-15 | End: 2019-04-17

## 2019-04-15 RX ORDER — AER TRAVELER 0.5 G/1
1 SOLUTION RECTAL; TOPICAL EVERY 4 HOURS
Qty: 0 | Refills: 0 | Status: DISCONTINUED | OUTPATIENT
Start: 2019-04-15 | End: 2019-04-17

## 2019-04-15 RX ORDER — PRAMOXINE HYDROCHLORIDE 150 MG/15G
1 AEROSOL, FOAM RECTAL EVERY 4 HOURS
Qty: 0 | Refills: 0 | Status: DISCONTINUED | OUTPATIENT
Start: 2019-04-15 | End: 2019-04-17

## 2019-04-15 RX ORDER — BUTORPHANOL TARTRATE 2 MG/ML
2 INJECTION, SOLUTION INTRAMUSCULAR; INTRAVENOUS ONCE
Qty: 0 | Refills: 0 | Status: DISCONTINUED | OUTPATIENT
Start: 2019-04-15 | End: 2019-04-15

## 2019-04-15 RX ORDER — MAGNESIUM HYDROXIDE 400 MG/1
30 TABLET, CHEWABLE ORAL
Qty: 0 | Refills: 0 | Status: DISCONTINUED | OUTPATIENT
Start: 2019-04-15 | End: 2019-04-17

## 2019-04-15 RX ORDER — SODIUM CHLORIDE 9 MG/ML
1000 INJECTION, SOLUTION INTRAVENOUS
Qty: 0 | Refills: 0 | Status: DISCONTINUED | OUTPATIENT
Start: 2019-04-15 | End: 2019-04-15

## 2019-04-15 RX ORDER — OXYTOCIN 10 UNIT/ML
333.33 VIAL (ML) INJECTION
Qty: 20 | Refills: 0 | Status: COMPLETED | OUTPATIENT
Start: 2019-04-15

## 2019-04-15 RX ADMIN — Medication 0.2 MILLIGRAM(S): at 09:15

## 2019-04-15 RX ADMIN — Medication 600 MILLIGRAM(S): at 16:15

## 2019-04-15 RX ADMIN — Medication 0.2 MILLIGRAM(S): at 17:58

## 2019-04-15 RX ADMIN — Medication 600 MILLIGRAM(S): at 08:44

## 2019-04-15 RX ADMIN — Medication 600 MILLIGRAM(S): at 15:37

## 2019-04-15 RX ADMIN — CARBOPROST TROMETHAMINE 250 MICROGRAM(S): 250 INJECTION, SOLUTION INTRAMUSCULAR at 11:45

## 2019-04-15 RX ADMIN — Medication 0.2 MILLIGRAM(S): at 12:55

## 2019-04-15 RX ADMIN — Medication 600 MILLIGRAM(S): at 07:44

## 2019-04-15 RX ADMIN — Medication 125 MILLIUNIT(S)/MIN: at 09:30

## 2019-04-15 RX ADMIN — OXYCODONE AND ACETAMINOPHEN 1 TABLET(S): 5; 325 TABLET ORAL at 21:00

## 2019-04-15 RX ADMIN — BUTORPHANOL TARTRATE 2 MILLIGRAM(S): 2 INJECTION, SOLUTION INTRAMUSCULAR; INTRAVENOUS at 10:30

## 2019-04-15 RX ADMIN — BUTORPHANOL TARTRATE 2 MILLIGRAM(S): 2 INJECTION, SOLUTION INTRAMUSCULAR; INTRAVENOUS at 10:45

## 2019-04-15 RX ADMIN — OXYCODONE AND ACETAMINOPHEN 1 TABLET(S): 5; 325 TABLET ORAL at 20:05

## 2019-04-15 RX ADMIN — Medication 1000 MILLIUNIT(S)/MIN: at 07:00

## 2019-04-15 NOTE — OB PROVIDER H&P - ASSESSMENT
34yo  @ 39w in early labor.  - Admit for expectant vaginal delivery  - Admission labs  - CEFM  - GBS neg, no abx needed  - expectant management

## 2019-04-15 NOTE — OB PROVIDER DELIVERY SUMMARY - NSPROVIDERDELIVERYNOTE_OBGYN_ALL_OB_FT
at 7:00 AM of a live female, weight deferred to skin to skin and Apgars 9/9. Delivered TERE, no nuchal cord, clear fluid. Infant's head delivered with maternal expulsive efforts. Shoulders delivered without difficulty followed by the rest of the body. Nose and mouth were bulb suctioned. Cord clamped and cut after delay. Samples obtained. Baby handed to patient. Placenta delivered spontaneously, intact, 3VC. Fundus firm, minimal bleeding. Perineum and vagina inspected – small 1st degree perineal laceration repaired under local anesthesia. EBL 300cc. Hemostasis noted. Pt tolerated procedure well, in stable condition, recovering in LDR. Infant in LDR. Instrument/sponge count correct x 2 and confirmed by nurse.

## 2019-04-15 NOTE — DISCHARGE NOTE OB - CARE PLAN
Principal Discharge DX:	 (spontaneous vaginal delivery)  Goal:	rapid recovery  Assessment and plan of treatment:	Patient can transition to regular activity level and continue regular diet. Patient should follow up with Dr. Gonzales's office to schedule post partum visit. Patient should contact doctor earlier should she develop persistent/increased vaginal bleeding or fever.

## 2019-04-15 NOTE — DISCHARGE NOTE OB - MEDICATION SUMMARY - MEDICATIONS TO TAKE
I will START or STAY ON the medications listed below when I get home from the hospital:    ibuprofen 600 mg oral tablet  -- 1 tab(s) by mouth every 6 hours, As Needed -Moderate Pain (4 - 6) - for severe pain   -- Indication: For moderate to severe pain    ferrous sulfate 325 mg (65 mg elemental iron) oral tablet  -- 1 tab(s) by mouth once a day   -- Check with your doctor before becoming pregnant.  Do not chew, break, or crush.  May discolor urine or feces.    -- Indication: For acute blood loss anemia

## 2019-04-15 NOTE — CHART NOTE - NSCHARTNOTEFT_GEN_A_CORE
OB/gyn hospitalist:  Dr Gonzales updated on patient's bleeding.  Patient offers no complaints at this time.  Vitals stable  Will send labs now  Hemabate ordered  Continue to monitor     Alicja Magana MD

## 2019-04-15 NOTE — DISCHARGE NOTE OB - PATIENT PORTAL LINK FT
You can access the [facility] Portal, offered by [facility], by registering with the following website: [email or web address]

## 2019-04-15 NOTE — CHART NOTE - NSCHARTNOTEFT_GEN_A_CORE
OB/GYN hospitalist:  Called to patient bedside for heavy lochia - patient reports no dizziness/SOB.  BP: 118/60, HR 79   Exam: brisk bleeding when expressed, bimanual exam removal of approximately 400 cc blood clots, and small piece of placental membranes  fibroids palpated     Methergine IM given, continue pitocin   Bedside sono if continued bleeding  No labs at this time  Dr Gonzales updated  Alicja Magana MD

## 2019-04-15 NOTE — OB PROVIDER H&P - HISTORY OF PRESENT ILLNESS
32yo  @ 39w who is admitted in early labor. Pt says she has been melissa every 7-8 minutes since last night. Pt denies any vb, lof. Pt reports +FM. Pregnancy uncomplicated.

## 2019-04-15 NOTE — CHART NOTE - NSCHARTNOTEFT_GEN_A_CORE
OB/GYN hospitalist:  Patient reassessed after PPH - lochia heavy  Stadol given for patient comfort  no clots expressed, or manually removed    Vitals stable    Will continue to monitor  Alicja Magana MD

## 2019-04-15 NOTE — CHART NOTE - NSCHARTNOTEFT_GEN_A_CORE
Provider called to bedside by RN due to patient's heavy lochia.  She was previously examined and approximately 400cc of blood clots and pieces of membranes removed. Given Methergine 0.2mg IM.    Pt continues to deny HA, dizziness, CP, or SOB.   Uterine fundus globular and firm.  Approximately 300cc of clots evacuated.  Bedside sono shows continuous endometrial stripe, no evidence of retained membranes or products of conception.     T(C): 37.1 (04-15-19 @ 05:05), Max: 37.2 (04-15-19 @ 02:51)  T(F): 98.8 (04-15-19 @ 05:05), Max: 99 (04-15-19 @ 02:51)  HR: 83 (04-15-19 @ 09:10) (79 - 128)  BP: 121/77 (04-15-19 @ 09:10) (118/60 - 137/90)  RR: 17 (04-15-19 @ 05:05) (17 - 19)  SpO2: 100% (04-15-19 @ 06:38) (92% - 100%)    A/P:  Total EBL post-partum 700cc.    - Cytotec 1000mcg buccal  - Methergine Series post-partum  - CBC scheduled for 1400    Pt examined with Dr. Magana  Case discussed with Dr. Gonzales

## 2019-04-15 NOTE — DISCHARGE NOTE OB - PLAN OF CARE
rapid recovery Patient can transition to regular activity level and continue regular diet. Patient should follow up with Dr. Gonzales's office to schedule post partum visit. Patient should contact doctor earlier should she develop persistent/increased vaginal bleeding or fever.

## 2019-04-15 NOTE — DISCHARGE NOTE OB - CARE PROVIDER_API CALL
Louis Gonzales)  Obstetrics and Gynecology  [address], [address]  [place] [address]  Phone: [phone]  Fax: [phone]  Follow Up Time:

## 2019-04-15 NOTE — DISCHARGE NOTE OB - MATERIALS PROVIDED
Bottle Feeding Log/[facility] Hearing Screen Program/Birth Certificate Instructions/Breastfeeding Mother’s Support Group Information/Guide to Postpartum Care/Tdap Vaccination (VIS Pub Date: 2012)/Breastfeeding Log/Back To Sleep Handout/[facility] [place] Screening Program/Shaken Baby Prevention Handout

## 2019-04-16 LAB — T PALLIDUM AB TITR SER: NEGATIVE — SIGNIFICANT CHANGE UP

## 2019-04-16 RX ADMIN — OXYCODONE AND ACETAMINOPHEN 1 TABLET(S): 5; 325 TABLET ORAL at 22:53

## 2019-04-16 RX ADMIN — Medication 600 MILLIGRAM(S): at 10:26

## 2019-04-16 RX ADMIN — Medication 1 TABLET(S): at 10:27

## 2019-04-16 RX ADMIN — Medication 100 MILLIGRAM(S): at 10:26

## 2019-04-16 RX ADMIN — OXYCODONE AND ACETAMINOPHEN 1 TABLET(S): 5; 325 TABLET ORAL at 07:16

## 2019-04-16 RX ADMIN — OXYCODONE AND ACETAMINOPHEN 1 TABLET(S): 5; 325 TABLET ORAL at 01:15

## 2019-04-16 RX ADMIN — Medication 600 MILLIGRAM(S): at 11:15

## 2019-04-16 RX ADMIN — Medication 600 MILLIGRAM(S): at 16:28

## 2019-04-16 RX ADMIN — Medication 600 MILLIGRAM(S): at 17:15

## 2019-04-16 RX ADMIN — OXYCODONE AND ACETAMINOPHEN 1 TABLET(S): 5; 325 TABLET ORAL at 23:50

## 2019-04-16 RX ADMIN — Medication 0.2 MILLIGRAM(S): at 00:07

## 2019-04-16 RX ADMIN — OXYCODONE AND ACETAMINOPHEN 1 TABLET(S): 5; 325 TABLET ORAL at 00:24

## 2019-04-16 RX ADMIN — SODIUM CHLORIDE 3 MILLILITER(S): 9 INJECTION INTRAMUSCULAR; INTRAVENOUS; SUBCUTANEOUS at 07:16

## 2019-04-16 RX ADMIN — OXYCODONE AND ACETAMINOPHEN 1 TABLET(S): 5; 325 TABLET ORAL at 06:17

## 2019-04-16 RX ADMIN — Medication 0.2 MILLIGRAM(S): at 06:11

## 2019-04-16 NOTE — PROGRESS NOTE ADULT - SUBJECTIVE AND OBJECTIVE BOX
33y year old  s/p  at 39wks gestation PPD#1.   Patient seen and examined at bedside, no acute overnight events. Experienced PPH (EBL 700cc pospartum) yesterday, states no more clots or increased heavy bleeding after yesterdays event.   Patient is ambulating, +eating, +PO hydration, +voiding, +Flatus, -BM, +Formula feeding, +Breast feeding and pain is well controlled.  Denies headache, SOB, fever, chills and calf pain, lightheadedness    Vital Signs Last 24 Hrs  T(C): 37.1 (15 Apr 2019 19:31), Max: 37.1 (15 Apr 2019 19:31)  T(F): 98.8 (15 Apr 2019 19:31), Max: 98.8 (15 Apr 2019 19:31)  HR: 70 (15 Apr 2019 19:31) (70 - 128)  BP: 114/76 (15 Apr 2019 19:31) (111/76 - 155/94)  RR: 18 (15 Apr 2019 19:31) (18 - 18)  SpO2: 100% (15 Apr 2019 06:38) (92% - 100%)    Physical Exam:  General: NAD  Abdomen: soft, ND, minimally tender, Fundus firm at level of umbilicus. palpable fibroids  Ext: No cyanosis, edema or calf tenderness.     Labs:                        9.7    15.1  )-----------( 231      ( 15 Apr 2019 18:47 )             29.3     Urinalysis Basic - ( 15 Apr 2019 05:37 )    Color: Yellow / Appearance: Clear / S.005 / pH: x  Gluc: x / Ketone: Negative  / Bili: Negative / Urobili: Negative mg/dL   Blood: x / Protein: Negative mg/dL / Nitrite: Negative   Leuk Esterase: Negative / RBC: 0-2 /HPF / WBC 0-2   Sq Epi: x / Non Sq Epi: Occasional / Bacteria: x    Medication:  MEDICATIONS  (STANDING):  diphtheria/tetanus/pertussis (acellular) Vaccine (ADAcel) 0.5 milliLiter(s) IntraMuscular once  methylergonovine 0.2 milliGRAM(s) Oral every 6 hours  oxytocin Infusion 41.667 milliUNIT(s)/Min (125 mL/Hr) IV Continuous <Continuous>  oxytocin Infusion 333.333 milliUNIT(s)/Min (1000 mL/Hr) IV Continuous <Continuous>  prenatal multivitamin 1 Tablet(s) Oral daily  sodium chloride 0.9% lock flush 3 milliLiter(s) IV Push every 8 hours    MEDICATIONS  (PRN):  acetaminophen   Tablet .. 650 milliGRAM(s) Oral every 6 hours PRN Temp greater or equal to 38.5C (101.3F), Mild Pain (1 - 3)  dibucaine 1% Ointment 1 Application(s) Topical every 4 hours PRN Perineal Discomfort  diphenhydrAMINE 25 milliGRAM(s) Oral every 6 hours PRN Itching  docusate sodium 100 milliGRAM(s) Oral two times a day PRN Stool Softening  glycerin Suppository - Adult 1 Suppository(s) Rectal at bedtime PRN Constipation  hydrocortisone 1% Cream 1 Application(s) Topical every 4 hours PRN Moderate to Severe Perineal Pain  ibuprofen  Tablet. 600 milliGRAM(s) Oral every 6 hours PRN Moderate Pain (4 - 6)  lanolin Ointment 1 Application(s) Topical every 6 hours PRN Sore Nipples  magnesium hydroxide Suspension 30 milliLiter(s) Oral two times a day PRN Constipation  oxyCODONE    5 mG/acetaminophen 325 mG 1 Tablet(s) Oral every 3 hours PRN Moderate Pain (4 - 6)  oxyCODONE    5 mG/acetaminophen 325 mG 2 Tablet(s) Oral every 6 hours PRN Severe Pain (7 - 10)  pramoxine 1%/zinc 5% Cream 1 Application(s) Topical every 4 hours PRN Moderate to Severe Perineal Pain  simethicone 80 milliGRAM(s) Chew every 6 hours PRN Gas  witch hazel Pads 1 Application(s) Topical every 4 hours PRN Perineal Discomfort

## 2019-04-16 NOTE — PROGRESS NOTE ADULT - PROBLEM SELECTOR PLAN 1
-Continue with current management  -Encourage ambulation and increase PO intake  -anticipate d/c on PPD 2

## 2019-04-16 NOTE — PROGRESS NOTE ADULT - ASSESSMENT
33y year old  s/p  at 39wks gestation PPD#1, postpartum course complicated by PPH, total EBL 700cc.     Plan:   :  -Continue with current management  -Encourage ambulation and increase PO intake  -anticipate d/c on PPD 2    PPH:   -stable, no clots or continued heavy bleeding. Hb now 10.8 to 9.7 now  - s/p Cytotec 1000mcg buccal, Methergine Series post-partum  - f/u CBC, monitor Hb

## 2019-04-17 ENCOUNTER — APPOINTMENT (OUTPATIENT)
Dept: OBGYN | Facility: CLINIC | Age: 34
End: 2019-04-17

## 2019-04-17 VITALS
SYSTOLIC BLOOD PRESSURE: 119 MMHG | HEART RATE: 97 BPM | RESPIRATION RATE: 18 BRPM | DIASTOLIC BLOOD PRESSURE: 85 MMHG | TEMPERATURE: 98 F

## 2019-04-17 PROCEDURE — 86901 BLOOD TYPING SEROLOGIC RH(D): CPT

## 2019-04-17 PROCEDURE — 86780 TREPONEMA PALLIDUM: CPT

## 2019-04-17 PROCEDURE — 59050 FETAL MONITOR W/REPORT: CPT

## 2019-04-17 PROCEDURE — 59025 FETAL NON-STRESS TEST: CPT

## 2019-04-17 PROCEDURE — 81001 URINALYSIS AUTO W/SCOPE: CPT

## 2019-04-17 PROCEDURE — 85027 COMPLETE CBC AUTOMATED: CPT

## 2019-04-17 PROCEDURE — 86900 BLOOD TYPING SEROLOGIC ABO: CPT

## 2019-04-17 PROCEDURE — 85610 PROTHROMBIN TIME: CPT

## 2019-04-17 PROCEDURE — G0463: CPT

## 2019-04-17 PROCEDURE — 85730 THROMBOPLASTIN TIME PARTIAL: CPT

## 2019-04-17 PROCEDURE — 86850 RBC ANTIBODY SCREEN: CPT

## 2019-04-17 PROCEDURE — 76815 OB US LIMITED FETUS(S): CPT

## 2019-04-17 PROCEDURE — 85384 FIBRINOGEN ACTIVITY: CPT

## 2019-04-17 PROCEDURE — 36415 COLL VENOUS BLD VENIPUNCTURE: CPT

## 2019-04-17 RX ORDER — FERROUS SULFATE 325(65) MG
1 TABLET ORAL
Qty: 30 | Refills: 0 | OUTPATIENT
Start: 2019-04-17 | End: 2019-05-16

## 2019-04-17 RX ORDER — IBUPROFEN 200 MG
1 TABLET ORAL
Qty: 60 | Refills: 0 | OUTPATIENT
Start: 2019-04-17 | End: 2019-05-01

## 2019-04-17 RX ADMIN — Medication 1 TABLET(S): at 12:28

## 2019-04-17 RX ADMIN — Medication 600 MILLIGRAM(S): at 12:57

## 2019-04-17 RX ADMIN — Medication 600 MILLIGRAM(S): at 07:30

## 2019-04-17 RX ADMIN — Medication 600 MILLIGRAM(S): at 12:27

## 2019-04-17 RX ADMIN — Medication 600 MILLIGRAM(S): at 06:35

## 2019-04-17 NOTE — PROGRESS NOTE ADULT - ASSESSMENT
33y year old  s/p  at 39wks gestation PPD#2, postpartum course complicated by PPH, total EBL 700cc.     Plan:   :  -Continue with current management  -Encourage ambulation and increase PO intake  -anticipate d/c today    PPH:   -stable, no clots or continued heavy bleeding. Hb now 10.8 to 9.7 now  - s/p Cytotec 1000mcg buccal, Methergine Series post-partum  - f/u CBC, monitor Hb

## 2019-04-19 ENCOUNTER — APPOINTMENT (OUTPATIENT)
Dept: OBGYN | Facility: CLINIC | Age: 34
End: 2019-04-19

## 2019-04-19 VITALS — SYSTOLIC BLOOD PRESSURE: 127 MMHG | DIASTOLIC BLOOD PRESSURE: 75 MMHG

## 2019-04-19 LAB
BILIRUB UR QL STRIP: NORMAL
GLUCOSE UR-MCNC: NORMAL
HCG UR QL: 0.2 EU/DL
HGB UR QL STRIP.AUTO: NORMAL
KETONES UR-MCNC: NORMAL
LEUKOCYTE ESTERASE UR QL STRIP: NORMAL
NITRITE UR QL STRIP: NORMAL
PH UR STRIP: 6
PROT UR STRIP-MCNC: NORMAL
SP GR UR STRIP: 1

## 2019-05-20 NOTE — OB PROVIDER DELIVERY SUMMARY - NSANESTHESIALABOR_OBGYN_ALL_OB
May 20, 2019         Patient: Bill Hemphill   YOB: 1959   Date of Visit: 5/20/2019           To Whom it May Concern:    Bill Hemphill was seen in my clinic on 5/20/2019. He may return to work on 5/21/2019.    If you have any questions or concerns, please don't hesitate to call.        Sincerely,           Jean Pierre Meza M.D.  Electronically Signed      None

## 2019-05-30 ENCOUNTER — APPOINTMENT (OUTPATIENT)
Dept: OBGYN | Facility: CLINIC | Age: 34
End: 2019-05-30

## 2019-06-04 ENCOUNTER — APPOINTMENT (OUTPATIENT)
Dept: OBGYN | Facility: CLINIC | Age: 34
End: 2019-06-04
Payer: MEDICAID

## 2019-06-04 ENCOUNTER — APPOINTMENT (OUTPATIENT)
Dept: OBGYN | Facility: CLINIC | Age: 34
End: 2019-06-04

## 2019-06-04 VITALS
SYSTOLIC BLOOD PRESSURE: 126 MMHG | WEIGHT: 170 LBS | HEIGHT: 63 IN | HEART RATE: 93 BPM | DIASTOLIC BLOOD PRESSURE: 92 MMHG | BODY MASS INDEX: 30.12 KG/M2

## 2019-06-04 DIAGNOSIS — Z00.00 ENCOUNTER FOR GENERAL ADULT MEDICAL EXAMINATION W/OUT ABNORMAL FINDINGS: ICD-10-CM

## 2019-06-04 PROCEDURE — 99214 OFFICE O/P EST MOD 30 MIN: CPT

## 2019-06-07 LAB — CYTOLOGY CVX/VAG DOC THIN PREP: NORMAL

## 2019-11-18 NOTE — PROGRESS NOTE ADULT - SUBJECTIVE AND OBJECTIVE BOX
Dear Brianna    Your test results are attached, feel free to contact me via [employer]     I am not sure if you have gotten these results, but it does appear that norovirus is the culprit.  This should be self limiting, and hopefully you are already feeling better.      Shade Grubbs PA-C     33y year old  s/p  at 39wks gestation PPD#2.   Patient seen and examined at bedside, no acute overnight events. She states no more clots or increased bleeding since delivery.   Patient is ambulating, +eating, +PO hydration, +voiding, +Flatus, +BM, +Formula feeding and +Breast feeding and pain is well controlled.  Denies headache, SOB, fever, chills and calf pain.    Vital Signs   T(F): 98.2 (2019 19:36), Max: 98.8 (2019 09:15)  HR: 77 (2019 19:36) (77 - 105)  BP: 111/75 (2019 19:36) (109/74 - 111/75)  RR: 18 (2019 19:36) (18 - 18)  SpO2: --    Physical Exam:  General: NAD  CVS: RRR, +S1/S2  Lungs: CTAB, no wheeze, ronchi or rales.   Breast: No tenderness or abnormal discharge.  Abdomen: +BS, soft, ND, minimally tender, Fundus firm at level of umbilicus.   Pelvic: Minimal lochia  Ext: No cyanosis, edema or calf tenderness.     Labs:                        9.7    15.1  )-----------( 231      ( 15 Apr 2019 18:47 )             29.3         Medication:  MEDICATIONS  (STANDING):  diphtheria/tetanus/pertussis (acellular) Vaccine (ADAcel) 0.5 milliLiter(s) IntraMuscular once  oxytocin Infusion 41.667 milliUNIT(s)/Min (125 mL/Hr) IV Continuous <Continuous>  oxytocin Infusion 333.333 milliUNIT(s)/Min (1000 mL/Hr) IV Continuous <Continuous>  prenatal multivitamin 1 Tablet(s) Oral daily  sodium chloride 0.9% lock flush 3 milliLiter(s) IV Push every 8 hours    MEDICATIONS  (PRN):  acetaminophen   Tablet .. 650 milliGRAM(s) Oral every 6 hours PRN Temp greater or equal to 38.5C (101.3F), Mild Pain (1 - 3)  dibucaine 1% Ointment 1 Application(s) Topical every 4 hours PRN Perineal Discomfort  diphenhydrAMINE 25 milliGRAM(s) Oral every 6 hours PRN Itching  docusate sodium 100 milliGRAM(s) Oral two times a day PRN Stool Softening  glycerin Suppository - Adult 1 Suppository(s) Rectal at bedtime PRN Constipation  hydrocortisone 1% Cream 1 Application(s) Topical every 4 hours PRN Moderate to Severe Perineal Pain  ibuprofen  Tablet. 600 milliGRAM(s) Oral every 6 hours PRN Moderate Pain (4 - 6)  lanolin Ointment 1 Application(s) Topical every 6 hours PRN Sore Nipples  magnesium hydroxide Suspension 30 milliLiter(s) Oral two times a day PRN Constipation  oxyCODONE    5 mG/acetaminophen 325 mG 1 Tablet(s) Oral every 3 hours PRN Moderate Pain (4 - 6)  oxyCODONE    5 mG/acetaminophen 325 mG 2 Tablet(s) Oral every 6 hours PRN Severe Pain (7 - 10)  pramoxine 1%/zinc 5% Cream 1 Application(s) Topical every 4 hours PRN Moderate to Severe Perineal Pain  simethicone 80 milliGRAM(s) Chew every 6 hours PRN Gas  witch hazel Pads 1 Application(s) Topical every 4 hours PRN Perineal Discomfort

## 2020-03-16 ENCOUNTER — APPOINTMENT (OUTPATIENT)
Dept: OBGYN | Facility: CLINIC | Age: 35
End: 2020-03-16
Payer: SELF-PAY

## 2020-03-16 VITALS
SYSTOLIC BLOOD PRESSURE: 122 MMHG | DIASTOLIC BLOOD PRESSURE: 82 MMHG | WEIGHT: 173.06 LBS | BODY MASS INDEX: 30.66 KG/M2

## 2020-03-16 DIAGNOSIS — R10.30 LOWER ABDOMINAL PAIN, UNSPECIFIED: ICD-10-CM

## 2020-03-16 PROCEDURE — 99214 OFFICE O/P EST MOD 30 MIN: CPT

## 2020-04-27 ENCOUNTER — APPOINTMENT (OUTPATIENT)
Dept: OBGYN | Facility: CLINIC | Age: 35
End: 2020-04-27

## 2021-07-15 ENCOUNTER — APPOINTMENT (OUTPATIENT)
Dept: RHEUMATOLOGY | Facility: CLINIC | Age: 36
End: 2021-07-15
Payer: COMMERCIAL

## 2021-07-15 VITALS
OXYGEN SATURATION: 98 % | SYSTOLIC BLOOD PRESSURE: 126 MMHG | RESPIRATION RATE: 17 BRPM | TEMPERATURE: 98.1 F | DIASTOLIC BLOOD PRESSURE: 86 MMHG | HEIGHT: 63 IN | HEART RATE: 98 BPM

## 2021-07-15 PROCEDURE — 99204 OFFICE O/P NEW MOD 45 MIN: CPT

## 2021-07-15 RX ORDER — PRENATAL VIT NO.130/IRON/FOLIC 27MG-0.8MG
28-0.8 TABLET ORAL
Qty: 2 | Refills: 2 | Status: DISCONTINUED | COMMUNITY
Start: 2018-12-21 | End: 2021-07-15

## 2021-07-15 RX ORDER — PRENATAL VIT NO.130/IRON/FOLIC 27MG-0.8MG
28-0.8 TABLET ORAL DAILY
Qty: 90 | Refills: 2 | Status: DISCONTINUED | COMMUNITY
Start: 2019-03-29 | End: 2021-07-15

## 2021-07-15 RX ORDER — ALBUTEROL SULFATE 90 UG/1
108 (90 BASE) AEROSOL, METERED RESPIRATORY (INHALATION)
Qty: 3 | Refills: 2 | Status: DISCONTINUED | COMMUNITY
Start: 2019-03-29 | End: 2021-07-15

## 2021-07-16 NOTE — PHYSICAL EXAM
[General Appearance - Alert] : alert [General Appearance - In No Acute Distress] : in no acute distress [Sclera] : the sclera and conjunctiva were normal [PERRL With Normal Accommodation] : pupils were equal in size, round, and reactive to light [Extraocular Movements] : extraocular movements were intact [Outer Ear] : the ears and nose were normal in appearance [Oropharynx] : the oropharynx was normal [Neck Appearance] : the appearance of the neck was normal [Neck Cervical Mass (___cm)] : no neck mass was observed [Jugular Venous Distention Increased] : there was no jugular-venous distention [Thyroid Diffuse Enlargement] : the thyroid was not enlarged [Thyroid Nodule] : there were no palpable thyroid nodules [Auscultation Breath Sounds / Voice Sounds] : lungs were clear to auscultation bilaterally [Heart Rate And Rhythm] : heart rate was normal and rhythm regular [Heart Sounds] : normal S1 and S2 [Heart Sounds Gallop] : no gallops [Murmurs] : no murmurs [Heart Sounds Pericardial Friction Rub] : no pericardial rub [Bowel Sounds] : normal bowel sounds [Abdomen Soft] : soft [Abdomen Tenderness] : non-tender [Abdomen Mass (___ Cm)] : no abdominal mass palpated [Skin Color & Pigmentation] : normal skin color and pigmentation [Skin Turgor] : normal skin turgor [] : no rash [Deep Tendon Reflexes (DTR)] : deep tendon reflexes were 2+ and symmetric [Sensation] : the sensory exam was normal to light touch and pinprick [No Focal Deficits] : no focal deficits [FreeTextEntry1] : Left knee crepitus. No signs of synovitis, limitation of ROM or deformity in any joint.

## 2021-07-16 NOTE — ASSESSMENT
[FreeTextEntry1] : 36 y F with no PMHx presented for evaluation of B/l knee pains and ruling out a CTD. \par \par Impression: \par # B/l knee pains.\par From hx and exam, we do not suspect an underlying inflammatory arthritis.\par Pain could be due to early OA or patellofemoral syndrome.\par Already improving with PT, she should continue. \par Will order Xrays.\par \par # Concern for CTD expressed by OBGYN\par No concerning features for SLE, Sjogren's, RA, Scleroderma, MCTD, DM/PM on our hx or exam. \par Will send out serologies for SLE, inflammatory markers, TSH and thyroid Abs.\par \par Return to clinic in 2 months. \par \par \par

## 2021-07-16 NOTE — HISTORY OF PRESENT ILLNESS
[FreeTextEntry1] : 36 y F with no PMHx presented for evaluation of B/l knee pains and ruling out a CTD. \par The patient has been having b/l knee pains with activity and weight bearing.\par Right knee pain is worse than left, pain is worst when walking up the stairs. \par No knee swelling or stiffness; no joint pains/stiffness in any other joints. \par No current or historical feature of alopecia, oral ulcers, malar rash, sicca symptoms, swollen glands, pleuritic CP, photosensitivity or Raynaud's. \par Her OBGYN prescribed PT for b/l knee pains which is already improving.\par No smoking, alcohol or illicit drugs. \par \par \par

## 2021-07-16 NOTE — REVIEW OF SYSTEMS
[Joint Pain] : joint pain [Fever] : no fever [Chills] : no chills [Eye Pain] : no eye pain [Earache] : no earache [Nosebleeds] : no nosebleeds [Chest Pain] : no chest pain [Cough] : no cough [SOB on Exertion] : no shortness of breath during exertion [Abdominal Pain] : no abdominal pain [Diarrhea] : no diarrhea [Dysuria] : no dysuria [Skin Lesions] : no skin lesions [Dizziness] : no dizziness [Swollen Glands] : no swollen glands

## 2021-09-14 ENCOUNTER — APPOINTMENT (OUTPATIENT)
Dept: OBGYN | Facility: CLINIC | Age: 36
End: 2021-09-14

## 2021-09-24 ENCOUNTER — APPOINTMENT (OUTPATIENT)
Dept: RHEUMATOLOGY | Facility: CLINIC | Age: 36
End: 2021-09-24
Payer: COMMERCIAL

## 2021-09-24 VITALS
DIASTOLIC BLOOD PRESSURE: 80 MMHG | RESPIRATION RATE: 17 BRPM | TEMPERATURE: 98 F | BODY MASS INDEX: 30.65 KG/M2 | SYSTOLIC BLOOD PRESSURE: 136 MMHG | HEART RATE: 88 BPM | WEIGHT: 173 LBS | HEIGHT: 63 IN | OXYGEN SATURATION: 99 %

## 2021-09-24 PROCEDURE — 99214 OFFICE O/P EST MOD 30 MIN: CPT

## 2021-09-24 NOTE — PHYSICAL EXAM
[General Appearance - Well Nourished] : well nourished [General Appearance - Well Developed] : well developed [Sclera] : the sclera and conjunctiva were normal [Hearing Threshold Finger Rub Not Burnet] : hearing was normal [Musculoskeletal - Swelling] : no joint swelling seen [Nail Clubbing] : no clubbing  or cyanosis of the fingernails [Motor Tone] : muscle strength and tone were normal [FreeTextEntry1] : FROM throughout, no evidence of synovitis throughout, nontender joints on palpations.  no effusions or warmth over the knees.  she does have some crepitus of th eknees  [] : no rash [Skin Lesions] : no skin lesions [Affect] : the affect was normal [Mood] : the mood was normal

## 2021-09-24 NOTE — ASSESSMENT
[FreeTextEntry1] : patient most likley with patellofemoral syndrome which is improved with PT.  she is also noted to have a positive CCP which I think is a lab error.  she has no s/s to suggest RA \par \par --patient is to repeat CCP \par --xray knees \par --cont PT at home

## 2021-09-24 NOTE — HISTORY OF PRESENT ILLNESS
[FreeTextEntry1] : 36 y F with no PMHx presented for evaluation of B/l knee pains and ruling out a CTD. \par The patient has been having b/l knee pains with squading and using stairs.  \par Right knee pain is worse than left,\par No knee swelling or stiffness; no joint pains/stiffness in any other joints. \par no morning stiffness \par started PT with great improvement\par does not require any pain meds. \par No current or historical features of alopecia, oral ulcers, malar rash, sicca symptoms, swollen glands, pleuritic CP, photosensitivity or Raynaud's. \par \par \par patient did blood work of OB which showed a positive FLORENTIN 1:160 , negative RF/CCP\par \par on repeat at [facility] she is FLORENTIN negative but positive CCP at 80 ( strong positive) \par \par labs: \par positive CCP \par negative florentin/dsdna/ro/la/RF\par normal c3/c4\par normal tsh\par \par \par

## 2021-10-07 LAB
ANA SER IF-ACNC: NEGATIVE
CCP AB SER IA-ACNC: <8 UNITS
RF+CCP IGG SER-IMP: NEGATIVE

## 2021-10-11 NOTE — ED ADULT TRIAGE NOTE - BMI (KG/M2)
Nephrology ([facility]) Progress Note      Patient:  Ena Upton  YOB: 1971  Date of Service: 10/11/2021  MRN: [id number]   Acct: [id number]   Primary Care Physician: Yaron Wiggins APRN  Advance Directive:   Code Status and Medical Interventions:   Ordered at: 10/05/21 1107     Level Of Support Discussed With:    Next of Kin (If No Surrogate)     Code Status:    No CPR     Medical Interventions (Level of Support Prior to Arrest):    Full     Comments:    sister-Pat     Admit Date: 10/4/2021       Hospital Day: 6  Referring Provider: No ref. provider found      Patient personally seen and examined.  Complete chart including Consults, Notes, Operative Reports, Labs, Cardiology, and Radiology studies reviewed as able.        Subjective:  Ena Upton is a 50 y.o. female for whom we were consulted for evaluation and treatment of end stage renal disease. Maintenance hemodialysis Tuesday Thursday Saturday at [facility]. Unknown last time she went to HD.  History of type 2 diabetes, hypertension, hypothyroidism.  Frequent hospitalizations due to noncompliance with dialysis and her other medical treatments. Was just discharged from [facility], [place], operated by [facility] a few weeks ago.  Presented this time with altered mental status, initial blood glucose was >1500. No other history of recent events is known. Hospital course remarkable for improving mental status with correction of hyperglycemia. Tolerated HD well on 10/5.     Today, her mental status is appropriate.  Moved to floor.  She denied current chest pain, shortness of air at rest, nausea vomiting.  Tolerating diet.    Allergies:  Penicillins, Lamisil [terbinafine], Reglan [metoclopramide], and Stadol [butorphanol]    Home Meds:  Medications Prior to Admission   Medication Sig Dispense Refill Last Dose   • famotidine (PEPCID) 20 MG tablet Take 20 mg by mouth 2 (Two) Times a Day.      • gabapentin (NEURONTIN) 300 MG capsule Take  300 mg by mouth 3 (Three) Times a Day.      • isosorbide mononitrate (IMDUR) 120 MG 24 hr tablet Take 120 mg by mouth Daily.      • lisinopril (PRINIVIL,ZESTRIL) 20 MG tablet Take 20 mg by mouth 2 (Two) Times a Day.      • nicotine (NICODERM CQ) 21 MG/24HR patch Place 1 patch on the skin as directed by provider Daily.      • albuterol (PROVENTIL HFA;VENTOLIN HFA) 108 (90 BASE) MCG/ACT inhaler Inhale 2 puffs Every 4 (Four) Hours As Needed for wheezing.   Unknown at Unknown time   • carvedilol (COREG) 25 MG tablet Take 1 tablet by mouth 2 (Two) Times a Day With Meals. 60 tablet 3    • cloNIDine (CATAPRES) 0.3 MG tablet Take 1 tablet by mouth Every 8 (Eight) Hours. 90 tablet 3    • DULoxetine (CYMBALTA) 60 MG capsule Take 60 mg by mouth daily.      • hydrALAZINE (APRESOLINE) 50 MG tablet Take 1.5 tablets by mouth 3 (Three) Times a Day. 120 tablet 3    • insulin aspart (novoLOG) 100 UNIT/ML injection Inject 10 Units under the skin into the appropriate area as directed 3 (Three) Times a Day Before Meals.      • Insulin Glargine (BASAGLAR KWIKPEN) 100 UNIT/ML injection pen Inject 40 Units under the skin into the appropriate area as directed Every Night.      • lactulose (CHRONULAC) 10 GM/15ML solution Take 20 g by mouth 3 (Three) Times a Day.      • levothyroxine (SYNTHROID, LEVOTHROID) 50 MCG tablet Take 1 tablet by mouth Daily. 30 tablet 3    • NIFEdipine XL (PROCARDIA XL) 60 MG 24 hr tablet Take 60 mg by mouth 2 (two) times a day.      • rOPINIRole (REQUIP) 4 MG tablet Take 4 mg by mouth Every Night.      • simvastatin (ZOCOR) 20 MG tablet Take 40 mg by mouth Daily.          Medicines:  Current Facility-Administered Medications   Medication Dose Route Frequency Provider Last Rate Last Admin   • acetaminophen (TYLENOL) tablet 650 mg  650 mg Oral Q6H PRN Harrison Moya MD   650 mg at 10/11/21 1247   • carvedilol (COREG) tablet 25 mg  25 mg Oral BID With Meals Adán Merrill DO   25 mg at 10/11/21 0818   •  cloNIDine (CATAPRES) tablet 0.3 mg  0.3 mg Oral Q8H Adán Merrill DO   0.3 mg at 10/11/21 1249   • dextrose (D50W) 25 g/ 50mL Intravenous Solution 12.5 g  12.5 g Intravenous PRN Adán Merrill DO   12.5 g at 10/05/21 2314   • dextrose (D50W) 25 g/ 50mL Intravenous Solution 25 g  25 g Intravenous Q15 Min PRN Adán Merrill DO       • dextrose (GLUTOSE) oral gel 15 g  15 g Oral Q15 Min PRN Adán Merrill DO       • enoxaparin (LOVENOX) syringe 30 mg  30 mg Subcutaneous Nightly Adán Merrill DO   30 mg at 10/10/21 2223   • famotidine (PEPCID) tablet 20 mg  20 mg Oral Daily Adán Merrill DO   20 mg at 10/11/21 0818   • glucagon (human recombinant) (GLUCAGEN DIAGNOSTIC) injection 1 mg  1 mg Subcutaneous Q15 Min PRN Adán Merrill DO       • hydrALAZINE (APRESOLINE) tablet 75 mg  75 mg Oral Q8H Adán Merrill DO   75 mg at 10/11/21 1247   • insulin detemir (LEVEMIR) injection 10 Units  10 Units Subcutaneous Nightly Adán Merrill DO   10 Units at 10/10/21 2222   • insulin lispro (humaLOG) injection 0-9 Units  0-9 Units Subcutaneous TID AC Adán Merrill DO   4 Units at 10/11/21 1636   • ipratropium-albuterol (DUO-NEB) nebulizer solution 3 mL  3 mL Nebulization Q6H PRN Adán Merrill, DO       • isosorbide mononitrate (IMDUR) 24 hr tablet 60 mg  60 mg Oral Q24H Adán Merrill DO   60 mg at 10/11/21 0819   • labetalol (NORMODYNE,TRANDATE) injection 20 mg  20 mg Intravenous Q6H PRN Adán Merrill DO   20 mg at 10/09/21 1404   • levothyroxine (SYNTHROID, LEVOTHROID) tablet 50 mcg  50 mcg Oral Q AM Adán Merrill DO   50 mcg at 10/11/21 0502   • lisinopril (PRINIVIL,ZESTRIL) tablet 20 mg  20 mg Oral BID Adán Merrill DO   20 mg at 10/11/21 0818   • NIFEdipine XL (PROCARDIA XL) 24 hr tablet 60 mg  60 mg Oral Q24H Adán Merrill DO   60 mg at 10/11/21 0818   • ondansetron (ZOFRAN) injection 4 mg  4 mg Intravenous Q6H PRN Adán Merrill DO       • sodium chloride 0.9 % bolus  100 mL  100 mL Intravenous PRN Eron Blood MD       • sodium chloride 0.9 % flush 10 mL  10 mL Intravenous PRN Adán Merrill DO       • sodium chloride 0.9 % flush 10 mL  10 mL Intravenous Once PRN Adán Merrill DO       • sodium chloride 0.9 % flush 10 mL  10 mL Intravenous Q12H Adán Merrill DO   10 mL at 10/11/21 0820   • sodium chloride 0.9 % flush 10 mL  10 mL Intravenous PRN Adán Merrill DO       • sodium chloride 0.9 % flush 3 mL  3 mL Intravenous Q12H Adán Merrill DO   3 mL at 10/11/21 0818   • sodium chloride 0.9 % infusion  10 mL/hr Intravenous Continuous PRN Adán Merrill DO       • spironolactone (ALDACTONE) tablet 25 mg  25 mg Oral Daily Adán Merrill DO   25 mg at 10/11/21 0819       Past Medical History:  Past Medical History:   Diagnosis Date   • Anxiety    • Arthritis    • Chronic kidney disease     STAGE V RENAL FAILURE   • Depression    • Diabetes mellitus (HCC)    • HTN (hypertension)    • Hypothyroidism    • Obesity    • Tremors of nervous system        Past Surgical History:  Past Surgical History:   Procedure Laterality Date   • ARTERIOVENOUS FISTULA     • BREAST BIOPSY     • CARPAL TUNNEL RELEASE     • CATH LAB PROCEDURE     • CHOLECYSTECTOMY     • TUBAL ABDOMINAL LIGATION         Family History  Family History   Problem Relation Age of Onset   • Cancer Other    • Hypertension Other    • Kidney disease Other    • Heart disease Other    • Diabetes Other    • Diabetes Mother    • Diabetes Maternal Aunt    • Diabetes Maternal Grandmother        Social History  Social History     Socioeconomic History   • Marital status:    Tobacco Use   • Smoking status: Current Every Day Smoker     Packs/day: 0.50   • Smokeless tobacco: Never Used   • Tobacco comment: CUTTING BACK AND TRYING TO STOP SMOKING   Substance and Sexual Activity   • Alcohol use: No   • Drug use: No   • Sexual activity: Defer         Review of Systems:  History obtained from chart  review and the patient  General ROS: No fever or chills  Respiratory ROS: No cough, shortness of breath, wheezing  Cardiovascular ROS: No chest pain or palpitations  Gastrointestinal ROS: No abdominal pain or melena  Genito-Urinary ROS: No dysuria or hematuria  Psych ROS: No anxiety and depression    Objective:  Patient Vitals for the past 24 hrs:   BP Temp Temp src Pulse Resp SpO2 Weight   10/11/21 1226 170/75 97.7 °F (36.5 °C) -- 67 18 99 % --   10/11/21 0742 (!) 190/72 98.4 °F (36.9 °C) -- 64 16 97 % --   10/11/21 0459 163/70 98 °F (36.7 °C) Oral 63 16 98 % 77.5 kg (170 lb 12.8 oz)   10/11/21 0044 111/55 98.6 °F (37 °C) Oral 62 16 96 % --   10/10/21 1943 116/62 98.8 °F (37.1 °C) Oral 60 16 99 % --     No intake or output data in the 24 hours ending 10/11/21 1756  General: awake/alert   Chest:  clear to auscultation bilaterally without respiratory distress  CVS: regular rate and rhythm  Abdominal: soft, nontender, positive bowel sounds  Extremities: no cyanosis or edema  Skin: warm and dry without rash      Labs:  Results from last 7 days   Lab Units 10/10/21  0249 10/09/21  0317 10/08/21  0221   WBC 10*3/mm3 3.95 3.65 4.02   HEMOGLOBIN g/dL 8.6* 8.5* 9.1*   HEMATOCRIT % 28.3* 28.0* 28.7*   PLATELETS 10*3/mm3 120* 103* 91*         Results from last 7 days   Lab Units 10/10/21  0249 10/09/21  0317 10/08/21  1125 10/05/21  0224 10/04/21  2344   SODIUM mmol/L 133* 129* 133*   < > 104*   POTASSIUM mmol/L 4.1 3.8 3.8   < > 4.8   CHLORIDE mmol/L 96* 94* 96*   < > 67*   CO2 mmol/L 27.0 24.0 24.0   < > 17.0*   BUN mg/dL 17 23* 18   < > 45*   CREATININE mg/dL 3.48* 4.62* 3.52*   < > 4.87*   CALCIUM mg/dL 8.3* 8.3* 8.4*   < > 8.4*   BILIRUBIN mg/dL  --   --  0.4  --  0.6   ALK PHOS U/L  --   --  99  --  167*   ALT (SGPT) U/L  --   --  6  --  7   AST (SGOT) U/L  --   --  17  --  15   GLUCOSE mg/dL 190* 182* 273*   < > >1,500*    < > = values in this interval not displayed.       Radiology:   Imaging Results (Last 72 Hours)      ** No results found for the last 72 hours. **          Culture:  Blood Culture   Date Value Ref Range Status   10/05/2021 No growth at 4 days  Preliminary   10/04/2021 No growth at 4 days  Preliminary     Urine Culture   Date Value Ref Range Status   10/05/2021 <10,000 CFU/mL Mixed Polly Isolated  Final         Assessment   End-stage renal disease  Diabetes type 2 with diabetic nephropathy  Hypertension  Hyperosmolar hyperglycemic on admission  Hyponatremia due to volume overload/profound hyperglycemia  Metabolic acidosis  Acute metabolic encephalopathy  Anemia in chronic kidney disease  Secondary hyperparathyroidism  Hypothyroidism     Plan:  Hemodialysis Tuesday Thursday Saturday per routine scheduling  Monitor labs  Ultrafiltration as tolerated with dialysis  Continue to adjust bp meds as needed  Started thyroid meds  Discussed with patient, nursing    Surjit Avitia MD  10/11/2021  17:56 CDT   29.4

## 2021-10-27 ENCOUNTER — APPOINTMENT (OUTPATIENT)
Dept: OBGYN | Facility: CLINIC | Age: 36
End: 2021-10-27
Payer: COMMERCIAL

## 2021-10-27 VITALS
WEIGHT: 166.56 LBS | HEIGHT: 63 IN | DIASTOLIC BLOOD PRESSURE: 97 MMHG | SYSTOLIC BLOOD PRESSURE: 136 MMHG | HEART RATE: 81 BPM | BODY MASS INDEX: 29.51 KG/M2

## 2021-10-27 PROCEDURE — 99395 PREV VISIT EST AGE 18-39: CPT

## 2021-10-27 NOTE — COUNSELING
[Nutrition/ Exercise/ Weight Management] : nutrition, exercise, weight management [Body Image] : body image [Vitamins/Supplements] : vitamins/supplements [Sunscreen] : sunscreen [Drugs/Alcohol] : drugs, alcohol [Breast Self Exam] : breast self exam [Bladder Hygiene] : bladder hygiene [Confidentiality] : confidentiality [STD (testing, results, tx)] : STD (testing, results, tx) [Vaccines] : vaccines

## 2021-11-02 LAB — CYTOLOGY CVX/VAG DOC THIN PREP: NORMAL

## 2022-01-04 ENCOUNTER — APPOINTMENT (OUTPATIENT)
Dept: ANTEPARTUM | Facility: CLINIC | Age: 37
End: 2022-01-04
Payer: COMMERCIAL

## 2022-01-04 ENCOUNTER — ASOB RESULT (OUTPATIENT)
Age: 37
End: 2022-01-04

## 2022-01-04 PROCEDURE — 76856 US EXAM PELVIC COMPLETE: CPT | Mod: 59

## 2022-01-04 PROCEDURE — 76830 TRANSVAGINAL US NON-OB: CPT

## 2022-02-14 ENCOUNTER — APPOINTMENT (OUTPATIENT)
Dept: RHEUMATOLOGY | Facility: CLINIC | Age: 37
End: 2022-02-14
Payer: COMMERCIAL

## 2022-02-14 VITALS
OXYGEN SATURATION: 98 % | RESPIRATION RATE: 17 BRPM | HEART RATE: 94 BPM | HEIGHT: 63 IN | SYSTOLIC BLOOD PRESSURE: 142 MMHG | TEMPERATURE: 98 F | DIASTOLIC BLOOD PRESSURE: 90 MMHG

## 2022-02-14 PROCEDURE — 99214 OFFICE O/P EST MOD 30 MIN: CPT

## 2022-02-14 RX ORDER — DICLOFENAC SODIUM 1% 10 MG/G
1 GEL TOPICAL DAILY
Qty: 2 | Refills: 3 | Status: ACTIVE | COMMUNITY
Start: 2022-02-14 | End: 1900-01-01

## 2022-02-14 NOTE — PHYSICAL EXAM
[General Appearance - Well Nourished] : well nourished [General Appearance - Well Developed] : well developed [Sclera] : the sclera and conjunctiva were normal [Hearing Threshold Finger Rub Not [place]] : hearing was normal [Nail Clubbing] : no clubbing  or cyanosis of the fingernails [Musculoskeletal - Swelling] : no joint swelling seen [Motor Tone] : muscle strength and tone were normal [] : no rash [Skin Lesions] : no skin lesions [Affect] : the affect was normal [Mood] : the mood was normal

## 2022-02-14 NOTE — HISTORY OF PRESENT ILLNESS
[FreeTextEntry1] : 36 y F with no PMHx presented for evaluation of B/l knee pains and positive FLORENTIN 1:160. \par The patient has been having b/l knee pains with squatting and using stairs.  \par Right knee pain is worse than left,\par knee on occasion and not constant\par last a few minutes\par No knee swelling or stiffness; no joint pains/stiffness in any other joints. \par no morning stiffness \par no joint swelling \par started PT with great improvement\par does not require any pain meds. \par No current or historical features of alopecia, oral ulcers, malar rash, sicca symptoms, swollen glands, pleuritic CP, photosensitivity or Raynaud's. \par \par patient scans all day and has developed hand numbness at night time and in the morning.  No hand swelling.  \par \par \par patient did blood work of OB which showed a positive FLORENTIN 1:160 , negative RF/CCP\par \par on repeat at [facility] she is FLORENTIN negative but positive CCP at 80 ( strong positive) \par \par labs: \par positive CCP however on repeat negative \par negative florentin/dsdna/ro/la/RF\par normal c3/c4\par normal tsh\par \par \par

## 2022-02-14 NOTE — ASSESSMENT
[FreeTextEntry1] : 35 yo woman with knee pain on squatting and going up the stairs.  MOst likely patella-femoral syndrome \par \par --patients knee pain occurs on rare occasion.  she can use diclofenac gel for pain when needed \par --she had PT in past with great improvement \par --CTS: start using splints at bedtime \par  Ketoconazole Pregnancy And Lactation Text: This medication is Pregnancy Category C and it isn't know if it is safe during pregnancy. It is also excreted in breast milk and breast feeding isn't recommended.

## 2022-02-16 ENCOUNTER — APPOINTMENT (OUTPATIENT)
Dept: OBGYN | Facility: CLINIC | Age: 37
End: 2022-02-16
Payer: COMMERCIAL

## 2022-02-16 VITALS
WEIGHT: 166 LBS | BODY MASS INDEX: 29.41 KG/M2 | SYSTOLIC BLOOD PRESSURE: 143 MMHG | HEIGHT: 63 IN | DIASTOLIC BLOOD PRESSURE: 97 MMHG

## 2022-02-16 PROCEDURE — 58558Z: CUSTOM

## 2022-02-16 NOTE — PROCEDURE
[Hysteroscopy] : Hysteroscopy [Time out performed] : Pre-procedure time out performed.  Patient's name, date of birth and procedure confirmed. [Consent Obtained] : Consent obtained [Risks] : risks [Benefits] : benefits [Alternatives] : alternatives [Patient] : patient [Infection] : infection [Bleeding] : bleeding [Allergic Reaction] : allergic reaction [Lidocaine___ mL] : [unfilled] ~UmL of lidocaine [flexible] : Using aseptic technique a hysteroscopy was performed using a flexible hysteroscope [Sent to Pathology] : specimen was placed in buffered formalin and sent for pathology [Hemostasis obtained] : hemostasis obtained [Tolerated Well] : Patient tolerated the procedure well [Aftercare instructions/regstrictions given and follow-up scheduled] : Aftercare instructions/restrictions given and follow-up scheduled [de-identified] : under sterile condition with paracervical block the cervix was dilated and endometrial sampling obtained and sent to pathology.\par Hysteroscopic evaluation shows irregular endometrial cavity. Small polyp noted. Lush endometrium noted

## 2022-02-17 LAB
HCG UR QL: NEGATIVE
QUALITY CONTROL: YES

## 2022-02-25 LAB — CORE LAB BIOPSY: NORMAL

## 2022-03-10 NOTE — DISCHARGE NOTE OB - FUNCTIONAL STATUS DATE
Caller: Destiney Ponce    Relationship: Self    Best call back number: [phone]    Requested Prescriptions:   Requested Prescriptions     Pending Prescriptions Disp Refills   • atorvastatin (Lipitor) 40 MG tablet 90 tablet 1     Sig: Take 1 tablet by mouth Daily.        Pharmacy where request should be sent: [id number] [address] [facility] [phone] [facility] [phone] FX     Additional details provided by patient: PATIENT HAS A WEEK LEFT    Does the patient have less than a 3 day supply:  [] Yes  [x] No    Todd Lagunas Rep   03/10/22 14:56 EST   
17-Apr-2019

## 2022-03-16 ENCOUNTER — APPOINTMENT (OUTPATIENT)
Dept: OBGYN | Facility: CLINIC | Age: 37
End: 2022-03-16
Payer: COMMERCIAL

## 2022-03-16 VITALS
HEIGHT: 63 IN | WEIGHT: 171 LBS | DIASTOLIC BLOOD PRESSURE: 82 MMHG | SYSTOLIC BLOOD PRESSURE: 130 MMHG | BODY MASS INDEX: 30.3 KG/M2

## 2022-03-16 DIAGNOSIS — R10.33 PERIUMBILICAL PAIN: ICD-10-CM

## 2022-03-16 DIAGNOSIS — R10.2 PELVIC AND PERINEAL PAIN: ICD-10-CM

## 2022-03-16 PROCEDURE — 99213 OFFICE O/P EST LOW 20 MIN: CPT

## 2022-08-15 ENCOUNTER — APPOINTMENT (OUTPATIENT)
Dept: RHEUMATOLOGY | Facility: CLINIC | Age: 37
End: 2022-08-15

## 2022-10-03 NOTE — OB PROVIDER H&P - NSLASTDATEVISIT_OBGYN_ALL_OB
Date of Service: 10/3/2022  Dx: Lumbar spondylosis (M47.816), Chronic midline low back pain without sciatica (M54.50,G89.29),  Cervical pain (neck) (M54.2)       Insurance: [facility]  Insurance Limits: 20 visit limit  Visit #: 2  Authorized # of Visits: N/A  POC/Auth Expiration: N/A  Date of Last PN: 9/26/22 (Visit #2)  Authorizing Physician/Provider: Latrice Cordova   Next MD visit: N/A  Fall Risk: Standard          Precautions: None            Subjective: \"I think I overdid it cleaning at home this weekend. \" The patient reports that she not getting headaches as often. \"     Objective:     ROM:   Cervical Motion AROM    Right Left   Cervical Flexion 60   Cervical Extension 45   Cervical Side Bending 40* 18*   Cervical Rotation 50* 50*   *indicates activity was associated with pain    ROM:   Lumbar Motion AROM    Right Left   Lumbar Flexion 15.5cm   Lumbar Extension Mild mobility deficits   *indicates activity was associated with pain    Hip Motion PROM AROM    Right Left Right Left   Hip Flexion [facility] WFL N/A N/A   Hip Extension 10 10 N/A N/A   Hip ABD [facility] WFL N/A N/A   Hip ER (at 90deg hip flex) 55 55* N/A N/A   Hip IR (at 90deg hip flex) 20 20* N/A N/A   *indicates activity was associated with pain    Strength/MMT:  Hip Motion Strength    Right Left   Hip Flexion 4/5 4/5   Hip Extension 4-/5 4-/5   Hip ABD 4-/5 4-/5   Hip ER 4/5 4/5   Hip IR  4/5 4/5   *indicates activity was associated with pain     Knee Motion Strength    Right Left   Knee Extension 4/5 4/5   Knee Flexion 4/5 4/5   *indicates activity was associated with pain    Treatment:  Therapeutic Activities: x 4min  Elastic band rows: 2 x 10 (B), red theraband  Elastic band ext: 2 x 10 (B), red theraband    Therapeutic Exercise: x 15min  Prone quad stretch: x 30\" (B)   Manual hamstring stretch - supine: x 30\" (B)   DKC with SB: x 20   LTR with SB: x 10 (B)   DL bridging: 1 x 10   Hooklying clamshell: 2 x 10 (B)   Modified downward dog: 1 x 10 x 5\"   Seated low back stretch: x 30\"  HEP update: Reviewed new HEP handout with new exercises and progressions, provided verbal and written instructions/cueing for proper technique and common errors/compensations as needed    Neuromuscular Re-education: x 26min  Trigger Point Release: (B) lumbar paraspinals, glute med, glute max, cervical and thoracic paraspinals, upper trap, levator  Cervical distraction: 2 x 30\"   Subocciptial release: 2 x 30:   Cervical facet joint upglide mobilization - C3-C8: 4 x 10\" (L) each  Cervical facet joint downglide mobilization - C3-C8: 4 x 10\" (L)    Thoracic PA accessory joint mobs - T1-T12: Grade 3, 4 x 10\" each  Lumbar PA accessory joint mobs - L1-L5: Grade 3, 4 x 10\" each  Seated scap retraction: 2 x 10 x 3\" - cues for depression  Supine elastic band pull apart: 2 x 10 (B), red theraband  Hooklying PPT: 2 x 10 x 3\"   Glute set: 2 x 10 x 3\" (B)     Provider Interactions With Patient:   Added therapeutic exercises as documented, with cueing provided throughout performance to ensure correct technique during exercise. Provided patient with a written copy of exercise instruction which was also documented in patient's electronic medical chart. Assessment: Olayinka So received treatment for both her neck and her back this date. The patient is with deficits in lumbar spine, thoracic spine, and hip mobility which we continue to address in therapy. The patient's postural strengthening exercises were progressed this date, which she tolerated well. The patient isn't scheduled for another follow-up for a few weeks due to time and availability limitations, patient was added to wait list. Issued an updated HEP for her continue with for the next couple of weeks until next follow-up appt. Goals:   Short-Term Goals:  1. The patient will improve hip and lumbar spine mobility to facilitate forward bending to reach for the floor for household chores and tying her shoes. Timeframe: 4 weeks.    2. The patient will improve cervical rotation AROM to 65deg (B) pain-free to facilitate mobility for checking her blindspot for safety while driving a vehicle]. Timeframe: 4 weeks. 3. The patient will improve cervical extension AROM to 65deg pain-free to facilitate looking up for visualization while reaching overhead into upper cabinets. Timeframe: 4 weeks. Long-Term Goals:  1. The patient will improve hip and lumbar spine mobility to facilitate erect standing and walking after prolonged sitting and upon waking in the morning. Timeframe: 6 weeks. 2. Patient will improve core strength and endurance to facilitate walking distances up to 2 miles daily for community integration and general health and fitness. Timeframe: 6 weeks. 3. Patient will improve headaches symptoms to facilitate improved function and ADL tolerance. Timeframe: 6-8 weeks. 4. Patient will improve thoracic spine mobility and postural endurance to maintain proper posture with neutral head position while sitting to facilitate 4 hours of pain-free sitting. Timeframe: 6-8 weeks. Plan: Follow up on tolerance to updated HEP. HEP: Patient was issued a HEP handout 10/3/22 including elastic band low row and shoulder ext, to be added to HEP issued 9/26/22 inclduing upper trap and levator stretch, scap retraction, and supine band pull apart, to be added to HEP issued 9/14/2022 including glute set, DL bridging, supine clamshells, seated hamstrings stretch, seated low back stretch, and modified downward dog.        Charges: NMR x 2, Therex x 1         Total Timed Treatment: 30min    Total Treatment Time: 45min Unknown

## 2022-10-17 ENCOUNTER — APPOINTMENT (OUTPATIENT)
Dept: RHEUMATOLOGY | Facility: CLINIC | Age: 37
End: 2022-10-17

## 2022-10-17 VITALS
SYSTOLIC BLOOD PRESSURE: 130 MMHG | DIASTOLIC BLOOD PRESSURE: 80 MMHG | TEMPERATURE: 97.9 F | OXYGEN SATURATION: 99 % | HEART RATE: 91 BPM

## 2022-10-17 PROCEDURE — 99214 OFFICE O/P EST MOD 30 MIN: CPT

## 2022-10-17 RX ORDER — DICLOFENAC SODIUM 1% 10 MG/G
1 GEL TOPICAL DAILY
Qty: 2 | Refills: 1 | Status: ACTIVE | COMMUNITY
Start: 2022-10-17 | End: 1900-01-01

## 2022-10-17 NOTE — PHYSICAL EXAM
[General Appearance - Well Nourished] : well nourished [General Appearance - Well Developed] : well developed [Sclera] : the sclera and conjunctiva were normal [Nail Clubbing] : no clubbing  or cyanosis of the fingernails [Hearing Threshold Finger Rub Not [place]] : hearing was normal [Musculoskeletal - Swelling] : no joint swelling seen [Motor Tone] : muscle strength and tone were normal [FreeTextEntry1] : patient has fullness posterior knees , knees are FROM and nontender, no effusion anteriorly, no warmth, she has fullness over the medial aspect of the anterior elbows mild tenderness on palpation of the area.  elbows otherwise normal and no pain over the lateral or medial epicondyl  [] : no rash [Skin Lesions] : no skin lesions [Affect] : the affect was normal [Mood] : the mood was normal

## 2022-10-17 NOTE — ASSESSMENT
[FreeTextEntry1] : 36 yo woman with knee pain on squatting and going up the stairs and some posterior knee full ness. .  MOst likely patella-femoral syndrome.  Patient also with medial elbow tenderness and fullness \par \par --will obatin US of the knee to r/o baker cyst and US elbows to evaluate soft tissue swelling  \par --  she can use diclofenac gel for pain when needed \par --she had PT for her knees  with great improvement \par --CTS: start using splints at bedtime \par --will repeat CCP howevr patient symptoms do not seem inflammatory \par

## 2022-10-17 NOTE — HISTORY OF PRESENT ILLNESS
[FreeTextEntry1] : 36 y F with no PMHx presented for evaluation of B/l knee pains and positive FLORENTIN 1:160. \par The patient has been having b/l knee pains with squatting and using stairs.  \par Right knee pain is worse than left,\par knee pain on occasion and not constant\par last a few minutes\par No knee swelling or stiffness; \par no morning stiffness \par no joint swelling \par started PT with great improvement\par does not require any pain meds. \par No current or historical features of alopecia, oral ulcers, malar rash, sicca symptoms, swollen glands, pleuritic CP, photosensitivity or Raynaud's. \par \par patient scans all day and has developed hand numbness at night time and in the morning.  No hand swelling.  \par \par \par patient did blood work of OB which showed a positive FLORENTIN 1:160 , negative RF/CCP\par \par on repeat at [facility] she is FLORENTIN negative but positive CCP at 80 ( strong positive) and on repeat negative\par \par Today: patient con tinue sto complain of knee pain located in posterior aspect.  states that this are swells.  hurts with prolong walking.  no moring stiffness.  she also complain of pain over the medial aspect of her elbow with some swellinbg .  elbow pain particularly when she uses her hand s and arms too much.  does not take pain meds \par \par labs: \par positive CCP however on repeat negative \par negative florentin/dsdna/ro/la/RF\par normal c3/c4\par normal tsh\par \par \par

## 2022-11-07 ENCOUNTER — OUTPATIENT (OUTPATIENT)
Dept: OUTPATIENT SERVICES | Facility: HOSPITAL | Age: 37
LOS: 1 days | End: 2022-11-07

## 2022-11-07 ENCOUNTER — RESULT REVIEW (OUTPATIENT)
Age: 37
End: 2022-11-07

## 2022-11-07 ENCOUNTER — APPOINTMENT (OUTPATIENT)
Dept: ULTRASOUND IMAGING | Facility: CLINIC | Age: 37
End: 2022-11-07

## 2022-11-07 DIAGNOSIS — M25.561 PAIN IN RIGHT KNEE: ICD-10-CM

## 2022-11-07 PROCEDURE — 76881 US COMPL JOINT R-T W/IMG: CPT | Mod: 26,RT

## 2022-11-07 PROCEDURE — 76881 US COMPL JOINT R-T W/IMG: CPT | Mod: 26,LT,59

## 2022-11-07 PROCEDURE — 76882 US LMTD JT/FCL EVL NVASC XTR: CPT | Mod: 26,LT,59

## 2022-11-07 PROCEDURE — 76882 US LMTD JT/FCL EVL NVASC XTR: CPT | Mod: 26,RT,59

## 2022-11-10 ENCOUNTER — APPOINTMENT (OUTPATIENT)
Dept: OBGYN | Facility: CLINIC | Age: 37
End: 2022-11-10

## 2022-11-10 ENCOUNTER — RESULT REVIEW (OUTPATIENT)
Age: 37
End: 2022-11-10

## 2022-11-10 VITALS
SYSTOLIC BLOOD PRESSURE: 137 MMHG | DIASTOLIC BLOOD PRESSURE: 86 MMHG | WEIGHT: 158.44 LBS | BODY MASS INDEX: 28.07 KG/M2 | HEIGHT: 63 IN

## 2022-11-10 DIAGNOSIS — N64.4 MASTODYNIA: ICD-10-CM

## 2022-11-10 PROCEDURE — 99395 PREV VISIT EST AGE 18-39: CPT

## 2022-11-10 PROCEDURE — 99215 OFFICE O/P EST HI 40 MIN: CPT | Mod: 25

## 2022-11-10 NOTE — PHYSICAL EXAM
[Chaperone Present] : A chaperone was present in the examining room during all aspects of the physical examination [FreeTextEntry1] : Sarah Beth [Appropriately responsive] : appropriately responsive [Alert] : alert [No Acute Distress] : no acute distress [No Lymphadenopathy] : no lymphadenopathy [Regular Rate Rhythm] : regular rate rhythm [No Murmurs] : no murmurs [Clear to Auscultation B/L] : clear to auscultation bilaterally [Soft] : soft [Non-tender] : non-tender [Non-distended] : non-distended [No HSM] : No HSM [No Lesions] : no lesions [No Mass] : no mass [Oriented x3] : oriented x3 [Examination Of The Breasts] : a normal appearance [No Masses] : no breast masses were palpable [Labia Majora] : normal [Labia Minora] : normal [Normal] : normal [Enlarged ___ wks] : enlarged [unfilled] ~Uweeks [Uterine Adnexae] : normal

## 2022-11-16 LAB — CYTOLOGY CVX/VAG DOC THIN PREP: NORMAL

## 2022-12-12 ENCOUNTER — APPOINTMENT (OUTPATIENT)
Dept: RHEUMATOLOGY | Facility: CLINIC | Age: 37
End: 2022-12-12

## 2022-12-12 VITALS
HEART RATE: 82 BPM | OXYGEN SATURATION: 99 % | TEMPERATURE: 98 F | DIASTOLIC BLOOD PRESSURE: 80 MMHG | SYSTOLIC BLOOD PRESSURE: 140 MMHG

## 2022-12-12 DIAGNOSIS — M25.529 PAIN IN UNSPECIFIED ELBOW: ICD-10-CM

## 2022-12-12 DIAGNOSIS — M25.561 PAIN IN RIGHT KNEE: ICD-10-CM

## 2022-12-12 DIAGNOSIS — G56.03 CARPAL TUNNEL SYNDROM,BILATERAL UPPER LIMBS: ICD-10-CM

## 2022-12-12 DIAGNOSIS — M25.562 PAIN IN RIGHT KNEE: ICD-10-CM

## 2022-12-12 LAB
CCP AB SER IA-ACNC: <8 UNITS
RF+CCP IGG SER-IMP: NEGATIVE

## 2022-12-12 PROCEDURE — 99214 OFFICE O/P EST MOD 30 MIN: CPT

## 2022-12-12 NOTE — HISTORY OF PRESENT ILLNESS
[FreeTextEntry1] : 36 y F with no PMHx presented for evaluation of B/l knee pains and positive FLORENTIN 1:160. ( on repeat FLORENTIN negative) \par The patient has been having b/l knee pains with squatting and using stairs.  \par Right knee pain is worse than left,\par knee pain on occasion and not constant\par last a few minutes\par No knee swelling or stiffness; \par no morning stiffness \par no joint swelling \par started PT with great improvement\par does not require any pain meds. \par No current or historical features of alopecia, oral ulcers, malar rash, sicca symptoms, swollen glands, pleuritic CP, photosensitivity or Raynaud's. \par \par patient scans all day and has developed hand numbness at night time and in the morning.  No hand swelling.  \par \par patient did blood work for OB which showed a positive FLORENTIN 1:160 , negative RF/CCP\par \par on repeat at [facility] she is FLORENTIN negative but positive CCP at 80 ( strong positive) and on repeat negative CCP x2 \par \par Today:Patient denies elbow pain unless she over uses her hand and arm at work.  she had US of the elbow with normal findings.  Her knees are much better.  NO more knee pain on walking.  she had US of the knees which showed a tiny baker cyst involing the left knee.  her repeat FLORENTIN and CCP are both negative.  no other s/s symptoms to suggest RA or CTD \par \par labs: \par positive CCP however on repeat negative x2\par negative florentin/dsdna/ro/la/RF\par normal c3/c4\par normal tsh\par \par \par

## 2022-12-12 NOTE — PHYSICAL EXAM
[General Appearance - Well Nourished] : well nourished [General Appearance - Well Developed] : well developed [Sclera] : the sclera and conjunctiva were normal [Hearing Threshold Finger Rub Not [place]] : hearing was normal [Nail Clubbing] : no clubbing  or cyanosis of the fingernails [Musculoskeletal - Swelling] : no joint swelling seen [Motor Tone] : muscle strength and tone were normal [] : no rash [Skin Lesions] : no skin lesions [Affect] : the affect was normal [Mood] : the mood was normal

## 2022-12-12 NOTE — ASSESSMENT
[FreeTextEntry1] : 38 yo woman with knee pain and elbow pain.  Patient has medial epicondyle pain most likely related to her occupation ( scans all day).  knee pain most likely due to prolong standing, patellofemoral syndrome with tiny baker cyst.  ARIANA and CCP was repeated and both negative.  she has no s/s concerning for CTD or RA \par \par --she is to cont PT excercises at home \par --diclofenac gel PRN for pain  \par --CTS: cont using splints at bedtime.  if worsens she will need EMGs \par \par

## 2022-12-13 ENCOUNTER — RESULT REVIEW (OUTPATIENT)
Age: 37
End: 2022-12-13

## 2022-12-13 ENCOUNTER — APPOINTMENT (OUTPATIENT)
Dept: ULTRASOUND IMAGING | Facility: CLINIC | Age: 37
End: 2022-12-13

## 2022-12-13 ENCOUNTER — APPOINTMENT (OUTPATIENT)
Dept: MAMMOGRAPHY | Facility: CLINIC | Age: 37
End: 2022-12-13

## 2022-12-13 ENCOUNTER — OUTPATIENT (OUTPATIENT)
Dept: OUTPATIENT SERVICES | Facility: HOSPITAL | Age: 37
LOS: 1 days | End: 2022-12-13
Payer: COMMERCIAL

## 2022-12-13 DIAGNOSIS — N64.4 MASTODYNIA: ICD-10-CM

## 2022-12-13 PROCEDURE — 76642 ULTRASOUND BREAST LIMITED: CPT | Mod: 26,LT

## 2022-12-13 PROCEDURE — 77066 DX MAMMO INCL CAD BI: CPT

## 2022-12-13 PROCEDURE — 77066 DX MAMMO INCL CAD BI: CPT | Mod: 26

## 2022-12-13 PROCEDURE — G0279: CPT | Mod: 26

## 2022-12-13 PROCEDURE — 76642 ULTRASOUND BREAST LIMITED: CPT

## 2022-12-13 PROCEDURE — G0279: CPT

## 2023-01-04 ENCOUNTER — APPOINTMENT (OUTPATIENT)
Dept: OBGYN | Facility: CLINIC | Age: 38
End: 2023-01-04
Payer: COMMERCIAL

## 2023-01-04 VITALS
DIASTOLIC BLOOD PRESSURE: 70 MMHG | SYSTOLIC BLOOD PRESSURE: 118 MMHG | HEIGHT: 63 IN | BODY MASS INDEX: 27.46 KG/M2 | WEIGHT: 155 LBS

## 2023-01-04 DIAGNOSIS — N39.3 STRESS INCONTINENCE (FEMALE) (MALE): ICD-10-CM

## 2023-01-04 DIAGNOSIS — R39.15 URGENCY OF URINATION: ICD-10-CM

## 2023-01-04 PROCEDURE — 99213 OFFICE O/P EST LOW 20 MIN: CPT

## 2023-04-21 ENCOUNTER — APPOINTMENT (OUTPATIENT)
Dept: UROGYNECOLOGY | Facility: CLINIC | Age: 38
End: 2023-04-21

## 2023-05-31 ENCOUNTER — RESULT CHARGE (OUTPATIENT)
Age: 38
End: 2023-05-31

## 2023-05-31 ENCOUNTER — APPOINTMENT (OUTPATIENT)
Dept: UROGYNECOLOGY | Facility: CLINIC | Age: 38
End: 2023-05-31
Payer: COMMERCIAL

## 2023-05-31 VITALS
HEIGHT: 63 IN | DIASTOLIC BLOOD PRESSURE: 87 MMHG | SYSTOLIC BLOOD PRESSURE: 137 MMHG | WEIGHT: 172 LBS | BODY MASS INDEX: 30.48 KG/M2

## 2023-05-31 LAB
BILIRUB UR QL STRIP: NEGATIVE
CLARITY UR: CLEAR
COLLECTION METHOD: NORMAL
GLUCOSE UR-MCNC: NEGATIVE
HCG UR QL: 0.2 EU/DL
HGB UR QL STRIP.AUTO: NORMAL
KETONES UR-MCNC: NEGATIVE
LEUKOCYTE ESTERASE UR QL STRIP: NEGATIVE
NITRITE UR QL STRIP: NEGATIVE
PH UR STRIP: 6.5
PROT UR STRIP-MCNC: NEGATIVE
SP GR UR STRIP: 1

## 2023-05-31 PROCEDURE — 51701 INSERT BLADDER CATHETER: CPT | Mod: 59

## 2023-05-31 PROCEDURE — 99204 OFFICE O/P NEW MOD 45 MIN: CPT | Mod: 25

## 2023-05-31 PROCEDURE — 81003 URINALYSIS AUTO W/O SCOPE: CPT | Mod: QW

## 2023-05-31 NOTE — PHYSICAL EXAM
[Chaperone Present] : A chaperone was present in the examining room during all aspects of the physical examination [No Acute Distress] : in no acute distress [Oriented x3] : oriented to person, place, and time [None] : no CVA tenderness [Soft, Nontender] : the abdomen was soft and nontender [Labia Majora] : were normal [Labia Minora] : were normal [Bartholin's Gland] : both Bartholin's glands were normal  [Normal Appearance] : general appearance was normal [No Bleeding] : there was no active vaginal bleeding [2] : 2 [Aa ____] : Aa [unfilled] [Ba ____] : Ba [unfilled] [C ____] : C [unfilled] [GH ____] : GH [unfilled] [PB ____] : PB [unfilled] [TVL ____] : TVL  [unfilled] [Ap ____] : Ap [unfilled] [Bp ____] : Bp [unfilled] [D ____] : D [unfilled] [] : I [Normal] : normal [Soft] :  the cervix was soft [Post Void Residual ____ml] : post void residual was [unfilled] ml [Exam Deferred] : was deferred [Tenderness] : ~T no ~M abdominal tenderness observed [Distended] : not distended [FreeTextEntry3] : empty supine cst neg, +urethral hypermobiltiy [FreeTextEntry4] : no mass, no cyst, no lesion, nontender levators without banding or trigger points [de-identified] : about 12 cm, globular, mobile

## 2023-05-31 NOTE — HISTORY OF PRESENT ILLNESS
[FreeTextEntry1] : About 4 years of bothersome leakage of urine with both cough sneeze activity as well as with urgency, but worse with cough and activity. Since birth of younger child - 4 years old. Does not desire further fertility. Normal consistency bowel movements without straining or constipation. Reports she has a fibroid uterus. No bulge or pressure in the vagina, no protrusion of tissue. No treatment for the bladder issues as of yet. No gross hematuria, dysuria, recurrent UTIs, incomplete emptying of the bladder, or flank pain. Also reports vague pelvic pain she thinks is due to fibroids, not related to GI intake, passage of BMs, has been for a long time and is suprapubic, no radiation of discomfort.\par \par PMH high chol, HTN\par PSH none\par Never a smoker\par NKDA

## 2023-05-31 NOTE — REASON FOR VISIT
[Questionnaire Received] : Patient questionnaire received [Urinary Incontinence] : urinary incontinence [Pelvic Organ Prolapse] : pelvic organ prolapse [Pelvic Pain] : pelvic pain [Sexual Dysfunction] : sexual dysfunction [[employer] ] : provided by [employer]   [Interpreters_IDNumber] : [id number] [Interpreters_FullName] : Juvenal [TWNoteComboBox1] : [place]

## 2023-05-31 NOTE — PROCEDURE
[Straight Catheterization] : insertion of a straight catheter [Urinary Retention] : urinary retention [Stress Incontinence] : stress incontinence [Urgent Incontinence] : urgent incontinence [Patient] : the patient [___ Fr Straight Tip] : a [unfilled] in [place] straight tip catheter [None] : there were no complications with the catheter insertion [Clear] : clear [Culture] : culture [Urinalysis] : urinalysis [No Complications] : no complications [Tolerated Well] : the patient tolerated the procedure well [Post procedure instructions and information given] : Post procedure instructions and information were given and reviewed with patient. [2] : 2 [FreeTextEntry1] : cathed to obtain pvr and uncontam specimen

## 2023-05-31 NOTE — ASSESSMENT
[FreeTextEntry1] : 39 yo P2, not desiring further fertility, with NICOLE, CLYDE > OAB. On exam, CST neg, +urethral hypermobility noted,  ml clear, dip sent to lab to eval further, no POP on POPQ. The etiology of CLYDE was discussed. Management options including observation, behavioral modifications, medication, pessary, Impressa insert, periurethral bulking via cystoscopy, and surgery with midurethral sling were reviewed. Other anti-incontinence procedures such as a Hawkins or fascial sling also reviewed. The etiology of OAB was discussed. Management options including observation, behavioral modifications (dietary changes, monitoring fluid intake, bladder training, timed voids, use of pads/protective garments), kegels, PT, medications, PTNS, SNS, and bladder Botox were all reviewed. She mostly has symtpomatic CLYDE and we will target management to that. For now, she would like to think about options mainly likely MUS in the future, but start with PT - rx given.  All ques answered.  used throughout. RTO 6 months or prn.\par \par Plan:\par [] repeat PVR\par [] f/u UA, UCx\par [] PT

## 2023-06-01 LAB
APPEARANCE: CLEAR
BACTERIA: NEGATIVE /HPF
BILIRUBIN URINE: NEGATIVE
BLOOD URINE: NEGATIVE
CAST: 0 /LPF
COLOR: YELLOW
EPITHELIAL CELLS: 0 /HPF
GLUCOSE QUALITATIVE U: NEGATIVE MG/DL
KETONES URINE: NEGATIVE MG/DL
LEUKOCYTE ESTERASE URINE: NEGATIVE
MICROSCOPIC-UA: NORMAL
NITRITE URINE: NEGATIVE
PH URINE: 6.5
PROTEIN URINE: NEGATIVE MG/DL
RED BLOOD CELLS URINE: 0 /HPF
SPECIFIC GRAVITY URINE: 1
UROBILINOGEN URINE: 0.2 MG/DL
WHITE BLOOD CELLS URINE: 0 /HPF

## 2023-06-02 LAB — BACTERIA UR CULT: NORMAL

## 2024-01-29 NOTE — OB RN PATIENT PROFILE - WEIGHT IN LBS
Discharge Summary       PATIENT ID: Aixa Barkley  MRN: [id number]   YOB: 1990    DATE OF ADMISSION: 1/26/2024  6:09 PM    DATE OF DISCHARGE: 1/29/2024    PRIMARY CARE PROVIDER: No primary care provider on file.     ATTENDING PHYSICIAN: Dr. Zamarripa   DISCHARGING PROVIDER: Sushma Madala, MD    To contact this individual call [phone] and ask the  to page.  If unavailable ask to be transferred the Adult Hospitalist Department.    CONSULTATIONS: IP CONSULT TO HEMATOLOGY  IP CONSULT TO OB GYN  IP CONSULT TO HEMATOLOGY  IP CONSULT TO DIABETES MANAGEMENT  IP CONSULT TO CASE MANAGEMENT    PROCEDURES/SURGERIES: * No surgery found *    DIAGNOSES & HOSPITAL COURSE:     Per HPI:\" Aixa Barkley is a 33 y.o. female with a pmhx GERD, and umbilical hernia repair who presents with abdominal pain, and ecchymosis, and is being admitted for severe thrombocytopenia with right hemorrhagic ovarian cyst..     In the ED,  VSS.  Labs showed plt 5,000, and UA with moderate blood (5-10 RBC). 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        Erythrocytes within normal limits.        No schistocytes identified.        Severely decreased platelets with normal morphology.   -  LD, haptoglobin, INR wnl. Retic count wnl  - c/w IV decadron x 4 days   - s/p IVIG x 2 days   - will monitor   - platelets 141K on discharge   - discussed with hematology Dr. Pop - ok to dc      #Ruptured hemmorrhagic ovarian cyst  -CT abd: 3.1cm right adnexal hypodensity c/w hemorrhagic cyst, and  180.7

## 2024-01-31 ENCOUNTER — APPOINTMENT (OUTPATIENT)
Dept: OBGYN | Facility: CLINIC | Age: 39
End: 2024-01-31

## 2024-02-09 ENCOUNTER — APPOINTMENT (OUTPATIENT)
Dept: OBGYN | Facility: CLINIC | Age: 39
End: 2024-02-09
Payer: COMMERCIAL

## 2024-02-09 VITALS
SYSTOLIC BLOOD PRESSURE: 126 MMHG | BODY MASS INDEX: 29.77 KG/M2 | WEIGHT: 168 LBS | HEIGHT: 63 IN | DIASTOLIC BLOOD PRESSURE: 84 MMHG

## 2024-02-09 PROCEDURE — 99396 PREV VISIT EST AGE 40-64: CPT

## 2024-02-09 NOTE — PHYSICAL EXAM
[Chaperone Present] : A chaperone was present in the examining room during all aspects of the physical examination [FreeTextEntry1] : Samanta [Appropriately responsive] : appropriately responsive [Alert] : alert [No Acute Distress] : no acute distress [No Lymphadenopathy] : no lymphadenopathy [Regular Rate Rhythm] : regular rate rhythm [No Murmurs] : no murmurs [Clear to Auscultation B/L] : clear to auscultation bilaterally [Soft] : soft [Non-tender] : non-tender [Non-distended] : non-distended [No HSM] : No HSM [No Lesions] : no lesions [No Mass] : no mass [Oriented x3] : oriented x3 [Examination Of The Breasts] : a normal appearance [No Masses] : no breast masses were palpable [Labia Majora] : normal [Labia Minora] : normal [Normal] : normal [Enlarged ___ wks] : enlarged [unfilled] ~Uweeks [Uterine Adnexae] : normal

## 2024-02-09 NOTE — HISTORY OF PRESENT ILLNESS
[FreeTextEntry1] : 38-year-old  last menstrual cycle was February 3, 2024 presents with menometrorrhagia.  She is sexually active and uses nothing for contraception.  Gives history of fibroid uterus.

## 2024-02-09 NOTE — REVIEW OF SYSTEMS
[Incontinence] : incontinence [Abn Vaginal bleeding] : no abnormal vaginal bleeding [Negative] : Endocrine

## 2024-02-09 NOTE — COUNSELING
[Nutrition/ Exercise/ Weight Management] : nutrition, exercise, weight management [Body Image] : body image [Vitamins/Supplements] : vitamins/supplements [Sunscreen] : sunscreen [Smoking Cessation] : smoking cessation [Breast Self Exam] : breast self exam [Drugs/Alcohol] : drugs, alcohol [Bladder Hygiene] : bladder hygiene [Confidentiality] : confidentiality

## 2024-02-09 NOTE — DISCUSSION/SUMMARY
[FreeTextEntry1] : 38-year-old G2, P2 presents with menorrhagia and history of fibroid uterus.  Fibroid uterus noted on physical examination.  Pap GC chlamydia sent.  Pelvic sonogram and mammogram ordered.  Return in 4 to 6 weeks for hysteroscopy endometrial sampling for menorrhagia.

## 2024-02-14 LAB — CYTOLOGY CVX/VAG DOC THIN PREP: NORMAL

## 2024-02-15 ENCOUNTER — APPOINTMENT (OUTPATIENT)
Dept: ULTRASOUND IMAGING | Facility: CLINIC | Age: 39
End: 2024-02-15

## 2024-02-15 ENCOUNTER — RESULT REVIEW (OUTPATIENT)
Age: 39
End: 2024-02-15

## 2024-02-15 ENCOUNTER — OUTPATIENT (OUTPATIENT)
Dept: OUTPATIENT SERVICES | Facility: HOSPITAL | Age: 39
LOS: 1 days | End: 2024-02-15
Payer: COMMERCIAL

## 2024-02-15 ENCOUNTER — APPOINTMENT (OUTPATIENT)
Dept: MAMMOGRAPHY | Facility: CLINIC | Age: 39
End: 2024-02-15
Payer: COMMERCIAL

## 2024-02-15 DIAGNOSIS — Z00.8 ENCOUNTER FOR OTHER GENERAL EXAMINATION: ICD-10-CM

## 2024-02-15 DIAGNOSIS — N92.0 EXCESSIVE AND FREQUENT MENSTRUATION WITH REGULAR CYCLE: ICD-10-CM

## 2024-02-15 PROCEDURE — 77067 SCR MAMMO BI INCL CAD: CPT | Mod: 26

## 2024-02-15 PROCEDURE — 76830 TRANSVAGINAL US NON-OB: CPT

## 2024-02-15 PROCEDURE — 76856 US EXAM PELVIC COMPLETE: CPT | Mod: 26

## 2024-02-15 PROCEDURE — 76856 US EXAM PELVIC COMPLETE: CPT

## 2024-02-15 PROCEDURE — 76830 TRANSVAGINAL US NON-OB: CPT | Mod: 26

## 2024-02-15 PROCEDURE — 77063 BREAST TOMOSYNTHESIS BI: CPT

## 2024-02-15 PROCEDURE — 77063 BREAST TOMOSYNTHESIS BI: CPT | Mod: 26

## 2024-02-15 PROCEDURE — 77067 SCR MAMMO BI INCL CAD: CPT

## 2024-02-21 ENCOUNTER — TRANSCRIPTION ENCOUNTER (OUTPATIENT)
Age: 39
End: 2024-02-21

## 2024-03-06 ENCOUNTER — APPOINTMENT (OUTPATIENT)
Dept: OBGYN | Facility: CLINIC | Age: 39
End: 2024-03-06
Payer: COMMERCIAL

## 2024-03-06 VITALS
HEIGHT: 63 IN | DIASTOLIC BLOOD PRESSURE: 88 MMHG | WEIGHT: 169.6 LBS | SYSTOLIC BLOOD PRESSURE: 120 MMHG | BODY MASS INDEX: 30.05 KG/M2

## 2024-03-06 PROCEDURE — 99213 OFFICE O/P EST LOW 20 MIN: CPT

## 2024-03-06 NOTE — HISTORY OF PRESENT ILLNESS
[FreeTextEntry1] : 38-year-old -0-0-2 last menstrual cycle was 2024 for 4 days presents for follow-up care.  Complains of lower abdominal discomfort and pelvic sonogram shows multiple fibroid uterus with thickened endometrium.  Patient is sexually active and uses condoms for contraception.

## 2024-03-06 NOTE — DISCUSSION/SUMMARY
[FreeTextEntry1] : 38-year-old G2, P2 with symptomatic fibroid uterus and menorrhagia with thickened endometrium.  Return in 4 weeks for hysteroscopy endometrial sampling.  If all is well we may observe or do surgery depending on symptoms.

## 2024-06-11 ENCOUNTER — APPOINTMENT (OUTPATIENT)
Dept: OBGYN | Facility: CLINIC | Age: 39
End: 2024-06-11
Payer: COMMERCIAL

## 2024-06-11 VITALS
SYSTOLIC BLOOD PRESSURE: 112 MMHG | BODY MASS INDEX: 29.95 KG/M2 | WEIGHT: 169 LBS | HEIGHT: 63 IN | DIASTOLIC BLOOD PRESSURE: 70 MMHG

## 2024-06-11 DIAGNOSIS — D25.9 LEIOMYOMA OF UTERUS, UNSPECIFIED: ICD-10-CM

## 2024-06-11 DIAGNOSIS — N92.0 EXCESSIVE AND FREQUENT MENSTRUATION WITH REGULAR CYCLE: ICD-10-CM

## 2024-06-11 PROCEDURE — 58558Z: CUSTOM

## 2024-06-11 NOTE — PROCEDURE
[Hysteroscopy] : Hysteroscopy [Time out performed] : Pre-procedure time out performed.  Patient's name, date of birth and procedure confirmed. [Consent Obtained] : Consent obtained [Abnormal uterine bleeding] : abnormal uterine bleeding [Risks] : risks [Benefits] : benefits [Alternatives] : alternatives [Patient] : patient [Infection] : infection [Bleeding] : bleeding [Allergic Reaction] : allergic reaction [Lidocaine___ mL] : [unfilled] ~UmL of lidocaine [flexible] : Using aseptic technique a hysteroscopy was performed using a flexible hysteroscope [Sent to Pathology] : specimen was placed in buffered formalin and sent for pathology [Antibiotics given] : antibiotics not given [Hemostasis obtained] : hemostasis obtained [Tolerated Well] : Patient tolerated the procedure well [Aftercare instructions/regstrictions given and follow-up scheduled] : Aftercare instructions/restrictions given and follow-up scheduled [de-identified] : 39-year-old patient presents for hysteroscopy endometrial sampling for menometrorrhagia.  Under sterile condition and with paracervical block the cervix was dilated and endometrial sampling obtained and sent to pathology.  Hysteroscopic evaluation shows irregular cavity with lush features.  Patient tolerated the procedure well.

## 2024-06-17 LAB — CORE LAB BIOPSY: NORMAL

## 2024-07-09 ENCOUNTER — APPOINTMENT (OUTPATIENT)
Dept: OBGYN | Facility: CLINIC | Age: 39
End: 2024-07-09
Payer: COMMERCIAL

## 2024-07-09 VITALS
HEIGHT: 63 IN | BODY MASS INDEX: 29.95 KG/M2 | SYSTOLIC BLOOD PRESSURE: 118 MMHG | DIASTOLIC BLOOD PRESSURE: 80 MMHG | WEIGHT: 169 LBS

## 2024-07-09 DIAGNOSIS — R10.30 LOWER ABDOMINAL PAIN, UNSPECIFIED: ICD-10-CM

## 2024-07-09 DIAGNOSIS — D25.9 LEIOMYOMA OF UTERUS, UNSPECIFIED: ICD-10-CM

## 2024-07-09 DIAGNOSIS — R10.2 PELVIC AND PERINEAL PAIN: ICD-10-CM

## 2024-07-09 PROCEDURE — 99213 OFFICE O/P EST LOW 20 MIN: CPT

## 2024-08-26 NOTE — OB RN TRIAGE NOTE - HEART RATE (BEATS/MIN)
Use the estrogen vaginal for 4 weeks, report back/message in 4 weeks about symptoms.     You are due for dexa scan.  Please call the following number to make appointment :  [phone]  It is located in [address]    There is this is a new shingles vaccine available called shingrex  It is a series of 2 shots 2-6 months apart.  Considered more than 90% effective.  Please go to any pharmacy to get the  vaccine      
105

## 2024-09-26 NOTE — ED STATDOCS - NS ED NOTE  TALK SOMEONE YN
CHIEF COMPLAINT:   Chief Complaint   Patient presents with    Office Visit     Left ACL        Ananda Tracey returns to the clinic today for a recheck of the left knee ACL repair  Pt reports that since last visit  doing well,   Is pt currently in therapy? Yes  Current pain medication regiment:  none    Medications reviewed with patient:No changes made.  Tobacco Use Verified, as well as the Pt's PCP and Pharmacy Verification.   Surgical and Medical History Reviewed.     No